# Patient Record
Sex: FEMALE | Race: BLACK OR AFRICAN AMERICAN | Employment: UNEMPLOYED | URBAN - METROPOLITAN AREA
[De-identification: names, ages, dates, MRNs, and addresses within clinical notes are randomized per-mention and may not be internally consistent; named-entity substitution may affect disease eponyms.]

---

## 2018-06-02 ENCOUNTER — HOSPITAL ENCOUNTER (EMERGENCY)
Age: 33
Discharge: HOME | End: 2018-06-02
Attending: EMERGENCY MEDICINE
Payer: SELF-PAY

## 2018-06-02 VITALS
OXYGEN SATURATION: 97 % | TEMPERATURE: 98.7 F | HEART RATE: 91 BPM | DIASTOLIC BLOOD PRESSURE: 95 MMHG | WEIGHT: 293 LBS | SYSTOLIC BLOOD PRESSURE: 157 MMHG | RESPIRATION RATE: 18 BRPM | BODY MASS INDEX: 50.02 KG/M2 | HEIGHT: 64 IN

## 2018-06-02 DIAGNOSIS — T19.2XXA FOREIGN BODY IN VAGINA, INITIAL ENCOUNTER: Primary | ICD-10-CM

## 2018-06-02 PROCEDURE — 99282 EMERGENCY DEPT VISIT SF MDM: CPT

## 2018-06-02 RX ORDER — IBUPROFEN 600 MG/1
TABLET ORAL
Status: ON HOLD | COMMUNITY
End: 2020-04-05

## 2018-06-02 NOTE — DISCHARGE INSTRUCTIONS
Object in the Vagina: Care Instructions  Your Care Instructions    If something is left in the vagina for too long, it can cause pain and irritation. This could be a tampon, a diaphragm, a pessary, or a vibrator. Sometimes, a condom comes off during sex and gets lost in the vagina. You may have bleeding, a bad smell, and a discharge from the vagina. After the object is taken out, symptoms usually go away. Follow-up care is a key part of your treatment and safety. Be sure to make and go to all appointments, and call your doctor if you are having problems. It's also a good idea to know your test results and keep a list of the medicines you take. How can you care for yourself at home? · If your doctor prescribed antibiotics, take them as directed. Do not stop taking them just because you feel better. You need to take the full course of antibiotics. · Do not use a douche or vaginal wash unless your doctor tells you to. · You can prevent future problems if you limit how long something is in your vagina. For example, change a tampon at least every 4 to 8 hours. When should you call for help? Watch closely for changes in your health, and be sure to contact your doctor if:  ? · Your symptoms do not improve, or they get worse. ? · You have a fever. Where can you learn more? Go to http://randa-livier.info/. Enter F149 in the search box to learn more about \"Object in the Vagina: Care Instructions. \"  Current as of: March 20, 2017  Content Version: 11.4  © 2873-9770 [employer]. Care instructions adapted under license by [employer] (which disclaims liability or warranty for this information). If you have questions about a medical condition or this instruction, always ask your healthcare professional. [address] any warranty or liability for your use of this information.

## 2018-06-02 NOTE — ED NOTES
Chaperoned pelvic exam with [facility]. Assisted in removal of tampon from vagina. Patient tolerated well.

## 2018-06-02 NOTE — ED PROVIDER NOTES
HPI Comments: 29 y/o female with no significant PMHx, presenting with complaint of vaginal foreign body. The patient states that she thinks she inserted a tampon 5 days ago but later she did not feel the stings, so she continued to use additional tampons throughout the week. Today she noticed some brown vaginal discharge however, which made her concerned about a retained tampon. She reports a previous episode of retained tampon that had to be removed. She denies any pain. No fevers, chills, nausea, vomiting, generalized body aches or dysuria. The history is provided by the patient. Past Medical History:   Diagnosis Date    HPV (human papilloma virus) infection     Trichomonas        Past Surgical History:   Procedure Laterality Date    HX OTHER SURGICAL      vaginal wart removal by laser    HX WISDOM TEETH EXTRACTION           History reviewed. No pertinent family history. Social History     Social History    Marital status: SINGLE     Spouse name: N/A    Number of children: N/A    Years of education: N/A     Occupational History    Not on file. Social History Main Topics    Smoking status: Current Some Day Smoker    Smokeless tobacco: Never Used    Alcohol use Yes    Drug use: Not on file    Sexual activity: Yes     Partners: Male     Birth control/ protection: Condom     Other Topics Concern    Not on file     Social History Narrative         ALLERGIES: Review of patient's allergies indicates no known allergies. Review of Systems   Constitutional: Negative for chills and fever. Respiratory: Negative for shortness of breath. Cardiovascular: Negative for chest pain. Gastrointestinal: Negative for diarrhea, nausea and vomiting. Genitourinary: Negative for dysuria, frequency and urgency. Musculoskeletal: Negative for arthralgias and myalgias. Skin: Negative for rash. Neurological: Negative for syncope and light-headedness.    All other systems reviewed and are negative. Vitals:    06/02/18 1517   BP: (!) 157/95   Pulse: 91   Resp: 18   Temp: 98.7 °F (37.1 °C)   SpO2: 97%   Weight: (!) 181.4 kg (400 lb)   Height: 5' 4\" (1.626 m)            Physical Exam   Constitutional: She is oriented to person, place, and time. She appears well-developed and well-nourished. No distress. HENT:   Head: Normocephalic and atraumatic. Eyes: Conjunctivae and EOM are normal.   Neck: Normal range of motion. Neck supple. Cardiovascular: Normal rate, regular rhythm and normal heart sounds. Pulmonary/Chest: Effort normal and breath sounds normal.   Abdominal: Soft. She exhibits no distension. There is no tenderness. Genitourinary: Pelvic exam was performed with patient supine. There is no rash, tenderness or lesion on the right labia. There is no rash, tenderness or lesion on the left labia. No erythema, tenderness or bleeding in the vagina. There is a foreign body in the vagina. No signs of injury around the vagina. No vaginal discharge found. Musculoskeletal: Normal range of motion. Neurological: She is alert and oriented to person, place, and time. Skin: Skin is warm and dry. She is not diaphoretic. Nursing note and vitals reviewed. MDM  Number of Diagnoses or Management Options  Foreign body in vagina, initial encounter:      Amount and/or Complexity of Data Reviewed  Discuss the patient with other providers: yes (Dr. Valentino Calico, ED attending)    Patient Progress  Patient progress: stable        ED Course       Foreign Body Removal  Date/Time: 6/2/2018 3:39 PM  Performed by: Daksha Underwood by: Marcin Burks     Consent:     Consent obtained:  Verbal    Consent given by:  Patient  Location:     Location: vagina. Anesthesia (see MAR for exact dosages): Anesthesia method:  None  Procedure type:     Procedure complexity:  Simple  Procedure details:     Localization method:  Visualized    Removal mechanism:   Forceps    Foreign bodies recovered:  1 Description:  Tampon    Intact foreign body removal: yes    Post-procedure details:     Confirmation:  No additional foreign bodies on visualization    Patient tolerance of procedure: Tolerated well, no immediate complications          29 y/o female with no significant PMHx, presenting with complaint of vaginal foreign body. Tampon visualized on exam, removed with forceps - see procedure note above for details. Patient is well-appearing, afebrile, no tachycardic - doubt toxic shock syndrome. Safe for discharge home, PCP and/or GYN follow up as needed. Strict ED return precautions discussed and provided in writing at time of discharge. The patient verbalized understanding and agreement with this plan.

## 2019-07-02 ENCOUNTER — OFFICE VISIT (OUTPATIENT)
Dept: PRIMARY CARE CLINIC | Age: 34
End: 2019-07-02

## 2019-07-02 VITALS
RESPIRATION RATE: 18 BRPM | HEIGHT: 64 IN | OXYGEN SATURATION: 99 % | HEART RATE: 85 BPM | BODY MASS INDEX: 50.02 KG/M2 | DIASTOLIC BLOOD PRESSURE: 80 MMHG | WEIGHT: 293 LBS | SYSTOLIC BLOOD PRESSURE: 118 MMHG | TEMPERATURE: 98.5 F

## 2019-07-02 DIAGNOSIS — E66.01 MORBID OBESITY WITH BMI OF 70 AND OVER, ADULT (HCC): Primary | ICD-10-CM

## 2019-07-02 DIAGNOSIS — M79.89 LEG SWELLING: ICD-10-CM

## 2019-07-02 DIAGNOSIS — R06.83 SNORING: ICD-10-CM

## 2019-07-02 DIAGNOSIS — R73.03 PREDIABETES: ICD-10-CM

## 2019-07-02 NOTE — PROGRESS NOTES
Eber Romero is a 35 y.o.  female and presents with     Chief Complaint   Patient presents with    Establish Care     Need to discuss weight loss options. She has tried different things over the years and nothing has really helped    Morbid Obesity     Patient is here to establish care. She is morbidly obese with a BMI over 70. She has tried herbal medicines over-the-counter to lose weight without any success. He does snore at times. She was pregnant one time but lost the fetus. She she is 28years of age and she wants to have a child before she gets too old and is high risk for pregnancy. She wants to lose weight so that she can get pregnant and have a child. Tried Keto diet, Herbalife, exercise in the past.      Past Medical History:   Diagnosis Date    HPV (human papilloma virus) infection     Trichomonas      Past Surgical History:   Procedure Laterality Date    HX OTHER SURGICAL      vaginal wart removal by laser    HX POLYPECTOMY  01/2018    HX WISDOM TEETH EXTRACTION       Current Outpatient Medications   Medication Sig    ibuprofen (MOTRIN) 600 mg tablet Take  by mouth every six (6) hours as needed for Pain. No current facility-administered medications for this visit. Health Maintenance   Topic Date Due    Pneumococcal 0-64 years (1 of 1 - PPSV23) 08/07/1991    DTaP/Tdap/Td series (1 - Tdap) 08/07/2006    PAP AKA CERVICAL CYTOLOGY  08/07/2006    Influenza Age 9 to Adult  08/01/2019       There is no immunization history on file for this patient. Patient's last menstrual period was 06/02/2019. Allergies and Intolerances:   No Known Allergies    Family History:   Family History   Problem Relation Age of Onset    Hypertension Mother     Hypertension Father     Diabetes Sister     Diabetes Paternal Grandmother        Social History:   She  reports that she has been smoking.   She has never used smokeless tobacco.  She  reports that she drinks alcohol. Review of Systems:   General: negative for - chills, fatigue, fever, weight change  Psych: negative for - anxiety, depression, irritability or mood swings  ENT: negative for - headaches, hearing change, nasal congestion, oral lesions, sneezing or sore throat  Heme/ Lymph: negative for - bleeding problems, bruising, pallor or swollen lymph nodes  Endo: negative for - hot flashes, polydipsia/polyuria or temperature intolerance  Resp: negative for - cough, shortness of breath or wheezing  CV: negative for - chest pain, edema or palpitations  GI: negative for - abdominal pain, change in bowel habits, constipation, diarrhea or nausea/vomiting  : negative for - dysuria, hematuria, incontinence, pelvic pain or vulvar/vaginal symptoms  MSK: negative for - joint pain, joint swelling or muscle pain  Neuro: negative for - confusion, headaches, seizures or weakness  Derm: negative for - dry skin, hair changes, rash or skin lesion changes          Physical:   Vitals:   Vitals:    07/02/19 1354   BP: 118/80   Pulse: 85   Resp: 18   Temp: 98.5 °F (36.9 °C)   TempSrc: Oral   SpO2: 99%   Weight: (!) 408 lb (185.1 kg)   Height: 5' 4\" (1.626 m)           Exam:   HEENT- atraumatic,normocephalic, awake, oriented, well nourished, morbid obesity  Neck - supple,no enlarged lymph nodes, no JVD, no thyromegaly  Chest- CTA, no rhonchi, no crackles  Heart- rrr, no murmurs / gallop/rub  Abdomen- soft,BS+,NT, no hepatosplenomegaly  Ext -trace leg swelling  Neuro- no focal deficits. Power 5/5 all extremities  Skin - warm,dry, no obvious rashes. Review of Data:   LABS:   No results found for: WBC, HGB, HCT, PLT, HGBEXT, HCTEXT, PLTEXT, HGBEXT, HCTEXT, PLTEXT  No results found for: NA, K, CL, CO2, GLU, BUN, CREA  No results found for: CHOL, CHOLX, CHLST, CHOLV, HDL, LDL, LDLC, DLDLP, TGLX, TRIGL, TRIGP  No results found for: GPT        Impression / Plan:        ICD-10-CM ICD-9-CM    1.  Morbid obesity with BMI of 70 and over, adult ([facility] E66.01 278.01 CBC WITH AUTOMATED DIFF    C91.65 G06.47 METABOLIC PANEL, COMPREHENSIVE      TSH 3RD GENERATION      LIPID PANEL      REFERRAL TO DIETITIAN      REFERRAL TO BARIATRIC SURGERY   2. Prediabetes R73.03 790.29 HEMOGLOBIN A1C WITH EAG   3. Snoring R06.83 786.09    4. Leg swelling M79.89 729.81          Explained to patient risk benefits of the medications. Advised patient to stop meds if having any side effects. Pt verbalized understanding of the instructions. I have discussed the diagnosis with the patient and the intended plan as seen in the above orders. The patient has received an after-visit summary and questions were answered concerning future plans. I have discussed medication side effects and warnings with the patient as well. I have reviewed the plan of care with the patient, accepted their input and they are in agreement with the treatment goals. Reviewed plan of care. Patient has provided input and agrees with goals.         Fer Dudley MD

## 2019-07-02 NOTE — PROGRESS NOTES
Shital Yoon is a 35 y.o. female    Chief Complaint   Patient presents with    Establish Care     Need to discuss weight loss options. She has tried different things over the years and nothing has really helped     1. Have you been to the ER, urgent care clinic since your last visit? Hospitalized since your last visit? No    2. Have you seen or consulted any other health care providers outside of the [address] [place] [address] since your last visit? Include any pap smears or colon screening.  No     Health Maintenance Due   Topic Date Due    Pneumococcal 0-64 years (1 of 1 - PPSV23) 08/07/1991    DTaP/Tdap/Td series (1 - Tdap) 08/07/2006    PAP AKA CERVICAL CYTOLOGY  08/07/2006

## 2019-07-03 ENCOUNTER — TELEPHONE (OUTPATIENT)
Dept: PRIMARY CARE CLINIC | Age: 34
End: 2019-07-03

## 2019-07-03 LAB
ALBUMIN SERPL-MCNC: 3.7 G/DL (ref 3.5–5.5)
ALBUMIN/GLOB SERPL: 1.2 {RATIO} (ref 1.2–2.2)
ALP SERPL-CCNC: 74 IU/L (ref 39–117)
ALT SERPL-CCNC: 16 IU/L (ref 0–32)
AST SERPL-CCNC: 14 IU/L (ref 0–40)
BASOPHILS # BLD AUTO: 0 X10E3/UL (ref 0–0.2)
BASOPHILS NFR BLD AUTO: 0 %
BILIRUB SERPL-MCNC: 0.2 MG/DL (ref 0–1.2)
BUN SERPL-MCNC: 9 MG/DL (ref 6–20)
BUN/CREAT SERPL: 13 (ref 9–23)
CALCIUM SERPL-MCNC: 8.7 MG/DL (ref 8.7–10.2)
CHLORIDE SERPL-SCNC: 106 MMOL/L (ref 96–106)
CHOLEST SERPL-MCNC: 137 MG/DL (ref 100–199)
CO2 SERPL-SCNC: 23 MMOL/L (ref 20–29)
CREAT SERPL-MCNC: 0.71 MG/DL (ref 0.57–1)
EOSINOPHIL # BLD AUTO: 0.3 X10E3/UL (ref 0–0.4)
EOSINOPHIL NFR BLD AUTO: 3 %
ERYTHROCYTE [DISTWIDTH] IN BLOOD BY AUTOMATED COUNT: 15.1 % (ref 12.3–15.4)
EST. AVERAGE GLUCOSE BLD GHB EST-MCNC: 123 MG/DL
GLOBULIN SER CALC-MCNC: 3 G/DL (ref 1.5–4.5)
GLUCOSE SERPL-MCNC: 82 MG/DL (ref 65–99)
HBA1C MFR BLD: 5.9 % (ref 4.8–5.6)
HCT VFR BLD AUTO: 38.4 % (ref 34–46.6)
HDLC SERPL-MCNC: 39 MG/DL
HGB BLD-MCNC: 11.8 G/DL (ref 11.1–15.9)
IMM GRANULOCYTES # BLD AUTO: 0 X10E3/UL (ref 0–0.1)
IMM GRANULOCYTES NFR BLD AUTO: 0 %
LDLC SERPL CALC-MCNC: 86 MG/DL (ref 0–99)
LYMPHOCYTES # BLD AUTO: 2.9 X10E3/UL (ref 0.7–3.1)
LYMPHOCYTES NFR BLD AUTO: 28 %
MCH RBC QN AUTO: 24.6 PG (ref 26.6–33)
MCHC RBC AUTO-ENTMCNC: 30.7 G/DL (ref 31.5–35.7)
MCV RBC AUTO: 80 FL (ref 79–97)
MONOCYTES # BLD AUTO: 0.5 X10E3/UL (ref 0.1–0.9)
MONOCYTES NFR BLD AUTO: 5 %
NEUTROPHILS # BLD AUTO: 6.5 X10E3/UL (ref 1.4–7)
NEUTROPHILS NFR BLD AUTO: 64 %
PLATELET # BLD AUTO: 357 X10E3/UL (ref 150–450)
POTASSIUM SERPL-SCNC: 4.1 MMOL/L (ref 3.5–5.2)
PROT SERPL-MCNC: 6.7 G/DL (ref 6–8.5)
RBC # BLD AUTO: 4.8 X10E6/UL (ref 3.77–5.28)
SODIUM SERPL-SCNC: 140 MMOL/L (ref 134–144)
TRIGL SERPL-MCNC: 58 MG/DL (ref 0–149)
TSH SERPL DL<=0.005 MIU/L-ACNC: 1.47 UIU/ML (ref 0.45–4.5)
VLDLC SERPL CALC-MCNC: 12 MG/DL (ref 5–40)
WBC # BLD AUTO: 10.2 X10E3/UL (ref 3.4–10.8)

## 2019-07-03 NOTE — TELEPHONE ENCOUNTER
The diabetes treatment center needs clarification if thye want the patient to have regular medical nutritian therapy or do you want weight management as well.     Please contact rochelle at [phone]

## 2019-08-19 ENCOUNTER — OFFICE VISIT (OUTPATIENT)
Dept: PRIMARY CARE CLINIC | Age: 34
End: 2019-08-19

## 2019-08-19 VITALS
HEIGHT: 64 IN | SYSTOLIC BLOOD PRESSURE: 136 MMHG | WEIGHT: 293 LBS | OXYGEN SATURATION: 99 % | HEART RATE: 77 BPM | DIASTOLIC BLOOD PRESSURE: 85 MMHG | RESPIRATION RATE: 17 BRPM | TEMPERATURE: 98.3 F | BODY MASS INDEX: 50.02 KG/M2

## 2019-08-19 DIAGNOSIS — E66.01 OBESITY, MORBID (HCC): ICD-10-CM

## 2019-08-19 RX ORDER — PHENTERMINE HYDROCHLORIDE 37.5 MG/1
37.5 TABLET ORAL
Qty: 30 TAB | Refills: 1 | Status: ON HOLD | OUTPATIENT
Start: 2019-08-19 | End: 2020-04-05

## 2019-08-19 NOTE — PROGRESS NOTES
Stacie Weber is a 29 y.o. female    Chief Complaint   Patient presents with    Morbid Obesity    Weight Management     would like to talk about weight loss meds       1. Have you been to the ER, urgent care clinic since your last visit? Hospitalized since your last visit? No    2. Have you seen or consulted any other health care providers outside of the [address] [place] [address] since your last visit? Include any pap smears or colon screening. No    No flowsheet data found.      Health Maintenance Due   Topic Date Due    Pneumococcal 0-64 years (1 of 1 - PPSV23) 08/07/1991    DTaP/Tdap/Td series (1 - Tdap) 08/07/2006    PAP AKA CERVICAL CYTOLOGY  08/07/2006    Influenza Age 9 to Adult  08/01/2019

## 2019-08-19 NOTE — PROGRESS NOTES
Joshua Juárez is a 29 y.o.  female and presents with     Chief Complaint   Patient presents with    Morbid Obesity    Weight Management     would like to talk about weight loss meds     Patient is here for follow-up on her lab results. She has made appointment with bariatric surgery. He is supposed to go to obesity class. She informed that she has tried phentermine in the past and has not had any side effects. She is willing to try it again. Past Medical History:   Diagnosis Date    HPV (human papilloma virus) infection     Trichomonas      Past Surgical History:   Procedure Laterality Date    HX OTHER SURGICAL      vaginal wart removal by laser    HX POLYPECTOMY  01/2018    HX WISDOM TEETH EXTRACTION       Current Outpatient Medications   Medication Sig    phentermine (ADIPEX-P) 37.5 mg tablet Take 1 Tab by mouth every morning. Max Daily Amount: 37.5 mg.    ibuprofen (MOTRIN) 600 mg tablet Take  by mouth every six (6) hours as needed for Pain. No current facility-administered medications for this visit. Health Maintenance   Topic Date Due    Pneumococcal 0-64 years (1 of 1 - PPSV23) 08/07/1991    DTaP/Tdap/Td series (1 - Tdap) 08/07/2006    PAP AKA CERVICAL CYTOLOGY  08/07/2006    Influenza Age 9 to Adult  08/01/2019       There is no immunization history on file for this patient. Patient's last menstrual period was 07/10/2019 (exact date). Allergies and Intolerances:   No Known Allergies    Family History:   Family History   Problem Relation Age of Onset    Hypertension Mother     Hypertension Father     Diabetes Sister     Diabetes Paternal Grandmother        Social History:   She  reports that she has been smoking. She has been smoking about 0.25 packs per day. She has never used smokeless tobacco.  She  reports that she drinks alcohol.             Review of Systems:   General: negative for - chills, fatigue, fever, weight change  Psych: negative for - anxiety, depression, irritability or mood swings  ENT: negative for - headaches, hearing change, nasal congestion, oral lesions, sneezing or sore throat  Heme/ Lymph: negative for - bleeding problems, bruising, pallor or swollen lymph nodes  Endo: negative for - hot flashes, polydipsia/polyuria or temperature intolerance  Resp: negative for - cough, shortness of breath or wheezing  CV: negative for - chest pain, edema or palpitations  GI: negative for - abdominal pain, change in bowel habits, constipation, diarrhea or nausea/vomiting  : negative for - dysuria, hematuria, incontinence, pelvic pain or vulvar/vaginal symptoms  MSK: negative for - joint pain, joint swelling or muscle pain  Neuro: negative for - confusion, headaches, seizures or weakness  Derm: negative for - dry skin, hair changes, rash or skin lesion changes          Physical:   Vitals:   Vitals:    08/19/19 1309   BP: 136/85   Pulse: 77   Resp: 17   Temp: 98.3 °F (36.8 °C)   TempSrc: Oral   SpO2: 99%   Weight: (!) 408 lb 12.8 oz (185.4 kg)   Height: 5' 4\" (1.626 m)           Exam:   HEENT- atraumatic,normocephalic, awake, oriented, well nourished, morbid obesity  Neck - supple,no enlarged lymph nodes, no JVD, no thyromegaly  Chest- CTA, no rhonchi, no crackles  Heart- rrr, no murmurs / gallop/rub  Abdomen- soft,BS+,NT, no hepatosplenomegaly  Ext - no c/c/edema   Neuro- no focal deficits. Power 5/5 all extremities  Skin - warm,dry, no obvious rashes.           Review of Data:   LABS:   Lab Results   Component Value Date/Time    WBC 10.2 07/02/2019 02:37 PM    HGB 11.8 07/02/2019 02:37 PM    HCT 38.4 07/02/2019 02:37 PM    PLATELET 961 06/43/5555 02:37 PM     Lab Results   Component Value Date/Time    Sodium 140 07/02/2019 02:37 PM    Potassium 4.1 07/02/2019 02:37 PM    Chloride 106 07/02/2019 02:37 PM    CO2 23 07/02/2019 02:37 PM    Glucose 82 07/02/2019 02:37 PM    BUN 9 07/02/2019 02:37 PM    Creatinine 0.71 07/02/2019 02:37 PM     Lab Results   Component Value Date/Time    Cholesterol, total 137 07/02/2019 02:37 PM    HDL Cholesterol 39 (L) 07/02/2019 02:37 PM    LDL, calculated 86 07/02/2019 02:37 PM    Triglyceride 58 07/02/2019 02:37 PM     No results found for: GPT        Impression / Plan:        ICD-10-CM ICD-9-CM    1. Obesity, morbid (HCC) E66.01 278.01 phentermine (ADIPEX-P) 37.5 mg tablet     Explained the patient that phentermine is going to be short-term. She needs to walk every day and exercise to help her lose weight. Asked patient to keep appointment with bariatric surgery. Explained to patient risk benefits of the medications. Advised patient to stop meds if having any side effects. Pt verbalized understanding of the instructions. I have discussed the diagnosis with the patient and the intended plan as seen in the above orders. The patient has received an after-visit summary and questions were answered concerning future plans. I have discussed medication side effects and warnings with the patient as well. I have reviewed the plan of care with the patient, accepted their input and they are in agreement with the treatment goals. Reviewed plan of care. Patient has provided input and agrees with goals. Follow-up and Dispositions    · Return in about 3 months (around 11/19/2019).          Donnie Painting MD

## 2020-04-05 ENCOUNTER — APPOINTMENT (OUTPATIENT)
Dept: CT IMAGING | Age: 35
DRG: 139 | End: 2020-04-05
Attending: EMERGENCY MEDICINE
Payer: MEDICAID

## 2020-04-05 ENCOUNTER — HOSPITAL ENCOUNTER (INPATIENT)
Age: 35
LOS: 2 days | Discharge: HOME | DRG: 139 | End: 2020-04-07
Attending: EMERGENCY MEDICINE | Admitting: FAMILY MEDICINE
Payer: MEDICAID

## 2020-04-05 ENCOUNTER — HOSPITAL ENCOUNTER (EMERGENCY)
Dept: GENERAL RADIOLOGY | Age: 35
Discharge: HOME | DRG: 139 | End: 2020-04-05
Attending: EMERGENCY MEDICINE
Payer: MEDICAID

## 2020-04-05 DIAGNOSIS — J18.9 COMMUNITY ACQUIRED PNEUMONIA OF RIGHT LUNG, UNSPECIFIED PART OF LUNG: Primary | ICD-10-CM

## 2020-04-05 DIAGNOSIS — J45.41 MODERATE PERSISTENT ASTHMA WITH ACUTE EXACERBATION: ICD-10-CM

## 2020-04-05 PROBLEM — I10 HYPERTENSION: Status: ACTIVE | Noted: 2020-04-05

## 2020-04-05 LAB
ALBUMIN SERPL-MCNC: 3.8 G/DL (ref 3.5–5)
ALBUMIN/GLOB SERPL: 0.8 {RATIO} (ref 1.1–2.2)
ALP SERPL-CCNC: 85 U/L (ref 45–117)
ALT SERPL-CCNC: 30 U/L (ref 12–78)
ANION GAP SERPL CALC-SCNC: 10 MMOL/L (ref 5–15)
AST SERPL-CCNC: 24 U/L (ref 15–37)
BASOPHILS # BLD: 0 K/UL (ref 0–0.1)
BASOPHILS NFR BLD: 0 % (ref 0–1)
BILIRUB SERPL-MCNC: 0.4 MG/DL (ref 0.2–1)
BNP SERPL-MCNC: 43 PG/ML (ref 0–125)
BUN SERPL-MCNC: 4 MG/DL (ref 6–20)
BUN/CREAT SERPL: 6 (ref 12–20)
CALCIUM SERPL-MCNC: 8.8 MG/DL (ref 8.5–10.1)
CHLORIDE SERPL-SCNC: 105 MMOL/L (ref 97–108)
CO2 SERPL-SCNC: 29 MMOL/L (ref 21–32)
COMMENT, HOLDF: NORMAL
CREAT SERPL-MCNC: 0.69 MG/DL (ref 0.55–1.02)
DIFFERENTIAL METHOD BLD: ABNORMAL
EOSINOPHIL # BLD: 0.8 K/UL (ref 0–0.4)
EOSINOPHIL NFR BLD: 7 % (ref 0–7)
ERYTHROCYTE [DISTWIDTH] IN BLOOD BY AUTOMATED COUNT: 15.6 % (ref 11.5–14.5)
GLOBULIN SER CALC-MCNC: 4.6 G/DL (ref 2–4)
GLUCOSE SERPL-MCNC: 97 MG/DL (ref 65–100)
HCT VFR BLD AUTO: 39.1 % (ref 35–47)
HGB BLD-MCNC: 11.8 G/DL (ref 11.5–16)
IMM GRANULOCYTES # BLD AUTO: 0 K/UL (ref 0–0.04)
IMM GRANULOCYTES NFR BLD AUTO: 0 % (ref 0–0.5)
LYMPHOCYTES # BLD: 2.1 K/UL (ref 0.8–3.5)
LYMPHOCYTES NFR BLD: 18 % (ref 12–49)
MCH RBC QN AUTO: 24.2 PG (ref 26–34)
MCHC RBC AUTO-ENTMCNC: 30.2 G/DL (ref 30–36.5)
MCV RBC AUTO: 80.1 FL (ref 80–99)
MONOCYTES # BLD: 0.6 K/UL (ref 0–1)
MONOCYTES NFR BLD: 5 % (ref 5–13)
NEUTS SEG # BLD: 8.1 K/UL (ref 1.8–8)
NEUTS SEG NFR BLD: 69 % (ref 32–75)
NRBC # BLD: 0 K/UL (ref 0–0.01)
NRBC BLD-RTO: 0 PER 100 WBC
PLATELET # BLD AUTO: 357 K/UL (ref 150–400)
PMV BLD AUTO: 9.2 FL (ref 8.9–12.9)
POTASSIUM SERPL-SCNC: 4.3 MMOL/L (ref 3.5–5.1)
PROT SERPL-MCNC: 8.4 G/DL (ref 6.4–8.2)
RBC # BLD AUTO: 4.88 M/UL (ref 3.8–5.2)
SAMPLES BEING HELD,HOLD: NORMAL
SODIUM SERPL-SCNC: 144 MMOL/L (ref 136–145)
WBC # BLD AUTO: 11.7 K/UL (ref 3.6–11)

## 2020-04-05 PROCEDURE — 94640 AIRWAY INHALATION TREATMENT: CPT

## 2020-04-05 PROCEDURE — 74011250637 HC RX REV CODE- 250/637: Performed by: INTERNAL MEDICINE

## 2020-04-05 PROCEDURE — 74011636320 HC RX REV CODE- 636/320: Performed by: EMERGENCY MEDICINE

## 2020-04-05 PROCEDURE — 80053 COMPREHEN METABOLIC PANEL: CPT

## 2020-04-05 PROCEDURE — 65660000000 HC RM CCU STEPDOWN

## 2020-04-05 PROCEDURE — 87635 SARS-COV-2 COVID-19 AMP PRB: CPT

## 2020-04-05 PROCEDURE — 74011000250 HC RX REV CODE- 250: Performed by: EMERGENCY MEDICINE

## 2020-04-05 PROCEDURE — 74011250636 HC RX REV CODE- 250/636: Performed by: INTERNAL MEDICINE

## 2020-04-05 PROCEDURE — 0100U RESPIRATORY PANEL,PCR,NASOPHARYNGEAL: CPT

## 2020-04-05 PROCEDURE — 74011250636 HC RX REV CODE- 250/636: Performed by: EMERGENCY MEDICINE

## 2020-04-05 PROCEDURE — 96365 THER/PROPH/DIAG IV INF INIT: CPT

## 2020-04-05 PROCEDURE — 99285 EMERGENCY DEPT VISIT HI MDM: CPT

## 2020-04-05 PROCEDURE — 71045 X-RAY EXAM CHEST 1 VIEW: CPT

## 2020-04-05 PROCEDURE — 83880 ASSAY OF NATRIURETIC PEPTIDE: CPT

## 2020-04-05 PROCEDURE — 71275 CT ANGIOGRAPHY CHEST: CPT

## 2020-04-05 PROCEDURE — 85025 COMPLETE CBC W/AUTO DIFF WBC: CPT

## 2020-04-05 PROCEDURE — 71260 CT THORAX DX C+: CPT

## 2020-04-05 PROCEDURE — 87040 BLOOD CULTURE FOR BACTERIA: CPT

## 2020-04-05 PROCEDURE — 71046 X-RAY EXAM CHEST 2 VIEWS: CPT

## 2020-04-05 PROCEDURE — 36415 COLL VENOUS BLD VENIPUNCTURE: CPT

## 2020-04-05 PROCEDURE — 96375 TX/PRO/DX INJ NEW DRUG ADDON: CPT

## 2020-04-05 RX ORDER — ACETAMINOPHEN 325 MG/1
650 TABLET ORAL
COMMUNITY

## 2020-04-05 RX ORDER — GUAIFENESIN 600 MG/1
600 TABLET, EXTENDED RELEASE ORAL EVERY 12 HOURS
Status: DISCONTINUED | OUTPATIENT
Start: 2020-04-05 | End: 2020-04-07 | Stop reason: HOSPADM

## 2020-04-05 RX ORDER — MAGNESIUM SULFATE HEPTAHYDRATE 40 MG/ML
2 INJECTION, SOLUTION INTRAVENOUS ONCE
Status: COMPLETED | OUTPATIENT
Start: 2020-04-05 | End: 2020-04-05

## 2020-04-05 RX ORDER — ALBUTEROL SULFATE 0.83 MG/ML
2.5 SOLUTION RESPIRATORY (INHALATION)
COMMUNITY
End: 2020-10-29

## 2020-04-05 RX ORDER — GUAIFENESIN/DEXTROMETHORPHAN 100-10MG/5
10 SYRUP ORAL
Status: DISCONTINUED | OUTPATIENT
Start: 2020-04-05 | End: 2020-04-07 | Stop reason: HOSPADM

## 2020-04-05 RX ORDER — ALBUTEROL SULFATE 90 UG/1
2 AEROSOL, METERED RESPIRATORY (INHALATION)
COMMUNITY

## 2020-04-05 RX ORDER — MINERAL OIL
180 ENEMA (ML) RECTAL DAILY
COMMUNITY

## 2020-04-05 RX ORDER — SODIUM CHLORIDE 0.9 % (FLUSH) 0.9 %
5-40 SYRINGE (ML) INJECTION AS NEEDED
Status: DISCONTINUED | OUTPATIENT
Start: 2020-04-05 | End: 2020-04-07 | Stop reason: HOSPADM

## 2020-04-05 RX ORDER — AMLODIPINE BESYLATE 5 MG/1
5 TABLET ORAL DAILY
Status: DISCONTINUED | OUTPATIENT
Start: 2020-04-05 | End: 2020-04-07 | Stop reason: HOSPADM

## 2020-04-05 RX ORDER — HYDRALAZINE HYDROCHLORIDE 20 MG/ML
20 INJECTION INTRAMUSCULAR; INTRAVENOUS
Status: DISCONTINUED | OUTPATIENT
Start: 2020-04-05 | End: 2020-04-07 | Stop reason: HOSPADM

## 2020-04-05 RX ORDER — SODIUM CHLORIDE 0.9 % (FLUSH) 0.9 %
5-40 SYRINGE (ML) INJECTION EVERY 8 HOURS
Status: DISCONTINUED | OUTPATIENT
Start: 2020-04-05 | End: 2020-04-07 | Stop reason: HOSPADM

## 2020-04-05 RX ORDER — SODIUM CHLORIDE 9 MG/ML
50 INJECTION, SOLUTION INTRAVENOUS CONTINUOUS
Status: DISCONTINUED | OUTPATIENT
Start: 2020-04-05 | End: 2020-04-06

## 2020-04-05 RX ADMIN — AZITHROMYCIN MONOHYDRATE 500 MG: 500 INJECTION, POWDER, LYOPHILIZED, FOR SOLUTION INTRAVENOUS at 21:12

## 2020-04-05 RX ADMIN — GUAIFENESIN 600 MG: 600 TABLET ORAL at 21:13

## 2020-04-05 RX ADMIN — AMLODIPINE BESYLATE 5 MG: 5 TABLET ORAL at 21:13

## 2020-04-05 RX ADMIN — SODIUM CHLORIDE 100 ML/HR: 900 INJECTION, SOLUTION INTRAVENOUS at 17:45

## 2020-04-05 RX ADMIN — IOPAMIDOL 100 ML: 755 INJECTION, SOLUTION INTRAVENOUS at 16:52

## 2020-04-05 RX ADMIN — ALBUTEROL SULFATE 1 DOSE: 2.5 SOLUTION RESPIRATORY (INHALATION) at 15:32

## 2020-04-05 RX ADMIN — HYDRALAZINE HYDROCHLORIDE 20 MG: 20 INJECTION INTRAMUSCULAR; INTRAVENOUS at 22:44

## 2020-04-05 RX ADMIN — ALBUTEROL SULFATE 1 DOSE: 2.5 SOLUTION RESPIRATORY (INHALATION) at 14:40

## 2020-04-05 RX ADMIN — WATER 2 G: 1 INJECTION INTRAMUSCULAR; INTRAVENOUS; SUBCUTANEOUS at 17:44

## 2020-04-05 RX ADMIN — Medication 10 ML: at 17:45

## 2020-04-05 RX ADMIN — Medication 10 ML: at 21:14

## 2020-04-05 RX ADMIN — MAGNESIUM SULFATE HEPTAHYDRATE 2 G: 40 INJECTION, SOLUTION INTRAVENOUS at 15:08

## 2020-04-05 RX ADMIN — GUAIFENESIN AND DEXTROMETHORPHAN 10 ML: 100; 10 SYRUP ORAL at 22:45

## 2020-04-05 RX ADMIN — METHYLPREDNISOLONE SODIUM SUCCINATE 125 MG: 125 INJECTION, POWDER, FOR SOLUTION INTRAMUSCULAR; INTRAVENOUS at 14:56

## 2020-04-05 NOTE — ED NOTES
TRANSFER - OUT REPORT:    Verbal report given to Marychuy RN(name) on Deepti Puente  being transferred to [place] [id number].) for routine progression of care       Report consisted of patients Situation, Background, Assessment and   Recommendations(SBAR). Information from the following report(s) SBAR, MAR, vitals, telemetry, all test results, isolation, o2 NC 2L was reviewed with the receiving nurse. Lines:   Peripheral IV 04/05/20 Right Antecubital (Active)   Site Assessment Clean, dry, & intact 4/5/2020  3:08 PM   Phlebitis Assessment 0 4/5/2020  3:08 PM   Infiltration Assessment 0 4/5/2020  3:08 PM   Dressing Status Clean, dry, & intact 4/5/2020  3:08 PM   Dressing Type Transparent 4/5/2020  3:08 PM   Hub Color/Line Status Pink 4/5/2020  3:08 PM       Peripheral IV 04/05/20 Left Antecubital (Active)   Site Assessment Clean, dry, & intact 4/5/2020  3:40 PM   Phlebitis Assessment 0 4/5/2020  3:40 PM   Infiltration Assessment 0 4/5/2020  3:40 PM   Dressing Status Clean, dry, & intact 4/5/2020  3:40 PM   Dressing Type Transparent 4/5/2020  3:40 PM   Hub Color/Line Status Pink 4/5/2020  3:40 PM        Opportunity for questions and clarification was provided. Patient transported with: [facility] ALS monitoring NS 100ml/hr, 2L NC reassessment at this time is unchanged pt is stable for transport.

## 2020-04-05 NOTE — ED PROVIDER NOTES
HPI   The patient is a 40-year-old black female who presents the emergency room with acute onset of a cough for about 4 to 5 days not associated with fever. In addition she has been wheezing and has a history of asthma in the past using an inhaler at this time. She is not currently on steroids.   She had no known exposure to covid   Past Medical History:   Diagnosis Date    HPV (human papilloma virus) infection     Ill-defined condition     \"Bronchitis\"    Trichomonas        Past Surgical History:   Procedure Laterality Date    HX OTHER SURGICAL      vaginal wart removal by laser    HX POLYPECTOMY  01/2018    HX WISDOM TEETH EXTRACTION           Family History:   Problem Relation Age of Onset    Hypertension Mother     Hypertension Father     Diabetes Sister     Diabetes Paternal Grandmother        Social History     Socioeconomic History    Marital status: SINGLE     Spouse name: Not on file    Number of children: Not on file    Years of education: Not on file    Highest education level: Not on file   Occupational History    Not on file   Social Needs    Financial resource strain: Not on file    Food insecurity     Worry: Not on file     Inability: Not on file    Transportation needs     Medical: Not on file     Non-medical: Not on file   Tobacco Use    Smoking status: Current Every Day Smoker     Packs/day: 0.25    Smokeless tobacco: Never Used   Substance and Sexual Activity    Alcohol use: Yes     Comment: socially    Drug use: Not Currently    Sexual activity: Yes     Partners: Male     Birth control/protection: Condom   Lifestyle    Physical activity     Days per week: Not on file     Minutes per session: Not on file    Stress: Not on file   Relationships    Social connections     Talks on phone: Not on file     Gets together: Not on file     Attends Zoroastrianism service: Not on file     Active member of club or organization: Not on file     Attends meetings of clubs or organizations: Not on file     Relationship status: Not on file    Intimate partner violence     Fear of current or ex partner: Not on file     Emotionally abused: Not on file     Physically abused: Not on file     Forced sexual activity: Not on file   Other Topics Concern    Not on file   Social History Narrative    Not on file         ALLERGIES: Patient has no known allergies. Review of Systems   All other systems reviewed and are negative. There were no vitals filed for this visit. Physical Exam  Vitals signs and nursing note reviewed. Constitutional:       Appearance: She is well-developed. HENT:      Head: Normocephalic and atraumatic. Mouth/Throat:      Pharynx: No oropharyngeal exudate. Eyes:      General: No scleral icterus. Conjunctiva/sclera: Conjunctivae normal.   Neck:      Musculoskeletal: Neck supple. Thyroid: No thyromegaly. Cardiovascular:      Rate and Rhythm: Normal rate and regular rhythm. Heart sounds: Normal heart sounds. No murmur. No friction rub. No gallop. Pulmonary:      Effort: No respiratory distress. Breath sounds: No stridor. No wheezing or rales. Comments: bilat wheezing 3 plus acc muscle use  Abdominal:      General: Bowel sounds are normal.      Palpations: Abdomen is soft. Tenderness: There is no abdominal tenderness. There is no guarding or rebound. Musculoskeletal: Normal range of motion. Lymphadenopathy:      Cervical: No cervical adenopathy. Skin:     General: Skin is warm and dry. Neurological:      Mental Status: She is alert and oriented to person, place, and time. MDM  Number of Diagnoses or Management Options  Community acquired pneumonia of right lung, unspecified part of lung:    Moderate persistent asthma with acute exacerbation:      Amount and/or Complexity of Data Reviewed  Clinical lab tests: ordered and reviewed  Tests in the radiology section of CPT®: ordered  Tests in the medicine section of CPT®: ordered and reviewed    Risk of Complications, Morbidity, and/or Mortality  Presenting problems: high  Diagnostic procedures: high  Management options: high           Procedures        Assessment and plan       the patient is having acute asthma attack somewhat better though CT shows infiltrate will admit for wheezing and community-acquired pneumonia rule out covid, she is requiring supplemental O2.        Total critical care time spent exclusive of procedures: 34   minutes

## 2020-04-05 NOTE — ED TRIAGE NOTES
Pt was wheeled to the treatment area. Pt states \"I have been short of breath for 3 days I have used an inhaler it has not worked. \" Pt has audible wheezing.

## 2020-04-05 NOTE — ED NOTES
Spoke with [facility] explained transport at [facility] and ready for transport now. Per Sacha 28 supervisor \"proceede with transport as scheduled. \"

## 2020-04-05 NOTE — ED NOTES
Post breathing treatments pt still with wheezing but improved air movement. Pt sitting up on stretcher texting at this time.

## 2020-04-05 NOTE — ED NOTES
Pt able to speak in full sentences spo2 95% RA pt requested to walk to bathroom. Pt ambulated to bathroom in room upon return pt had increased dyspnea with exertion.

## 2020-04-05 NOTE — ED NOTES
Pt placed on 2L NC for CT as she will need to lay flat. Per Dr Maurisio Grigsby ok to continue o2 after CT as well.

## 2020-04-05 NOTE — ED NOTES
TRANSFER - OUT REPORT:    Verbal report given to [facility] NRP with [facility](name) on Mode Minors  being transferred to [address] [place] [address] 335(unit) for routine progression of care       Report consisted of patients Situation, Background, Assessment and   Recommendations(SBAR). Information from the following report(s) SBAR, MAR, vitals, all test results, isolation, Telemetry was reviewed with the [facility] NRP. Lines:   Peripheral IV 04/05/20 Right Antecubital (Active)   Site Assessment Clean, dry, & intact 4/5/2020  3:08 PM   Phlebitis Assessment 0 4/5/2020  3:08 PM   Infiltration Assessment 0 4/5/2020  3:08 PM   Dressing Status Clean, dry, & intact 4/5/2020  3:08 PM   Dressing Type Transparent 4/5/2020  3:08 PM   Hub Color/Line Status Pink 4/5/2020  3:08 PM       Peripheral IV 04/05/20 Left Antecubital (Active)   Site Assessment Clean, dry, & intact 4/5/2020  3:40 PM   Phlebitis Assessment 0 4/5/2020  3:40 PM   Infiltration Assessment 0 4/5/2020  3:40 PM   Dressing Status Clean, dry, & intact 4/5/2020  3:40 PM   Dressing Type Transparent 4/5/2020  3:40 PM   Hub Color/Line Status Pink 4/5/2020  3:40 PM        Opportunity for questions and clarification was provided. Patient transported with: [facility] ALS monitoring, /hr, Reassessment at this time is unchanged pt is stable for transport.

## 2020-04-05 NOTE — ED NOTES
Clarified with Dr Hamida Weiss if Lactate will need to be drawn. Per Dr Hamida Weiss \"no lactate needed. \"

## 2020-04-05 NOTE — ED NOTES
Attempted to call report to [address] [place] [address] RN made aware transport is here, unable to give report at this time.

## 2020-04-05 NOTE — PROGRESS NOTES
1845  TRANSFER - IN REPORT:    Verbal report received from Nino Estrada RN(name) on Reche Minors  being received from [place], RN(unit) for routine progression of care      Report consisted of patients Situation, Background, Assessment and   Recommendations(SBAR). Information from the following report(s) SBAR, Kardex, Intake/Output, MAR, Recent Results and Cardiac Rhythm ST was reviewed with the receiving nurse. Opportunity for questions and clarification was provided. 1945 Patient arrived to the unit via ambulance. Introduced self as primary RN. Initial assessment completed. VSS. Pt denies additional complaints at this time. Plan for the evening discussed with pt and she verbalized understanding. Bed locked and in low position with call bell within reach. Instructed pt to press call joya when needed. White board updated with this RN's name. MD at bedside. Notified of elevated blood pressures. Blood pressure 197/106.    2240 Blood pressure 171/109. PRN hydralazine 20mg was administered. 0048 /116. Cuff was adjusted and rechecked for proper placement. MD was notified. Scott LARA was notified of continued elevated blood pressure. 0129 Clarification was obtained regarding orders for NS at 50ml/hr and 100ml/hr. Dr. Melisa Maloney ordered both to be discontinued. 0345 Pt resting quietly in bed with eyes closed, respirations even and unlabored. No acute distress noted. Call bell within reach. 0533 /53    0730 Bedside and Verbal shift change report given to [facility], RN (oncoming nurse) by Tye root RN (offgoing nurse). Report included the following information SBAR, Kardex, Intake/Output, MAR, Recent Results and Cardiac Rhythm ST/NSR.

## 2020-04-06 LAB
ALBUMIN SERPL-MCNC: 3.9 G/DL (ref 3.5–5)
ALBUMIN/GLOB SERPL: 0.8 {RATIO} (ref 1.1–2.2)
ALP SERPL-CCNC: 86 U/L (ref 45–117)
ALT SERPL-CCNC: 29 U/L (ref 12–78)
ANION GAP SERPL CALC-SCNC: 8 MMOL/L (ref 5–15)
APPEARANCE UR: CLEAR
AST SERPL-CCNC: 25 U/L (ref 15–37)
B PERT DNA SPEC QL NAA+PROBE: NOT DETECTED
BACTERIA URNS QL MICRO: NEGATIVE /HPF
BILIRUB DIRECT SERPL-MCNC: 0.1 MG/DL (ref 0–0.2)
BILIRUB SERPL-MCNC: 0.5 MG/DL (ref 0.2–1)
BILIRUB UR QL CFM: NEGATIVE
BILIRUB UR QL: NEGATIVE
BORDETELLA PARAPERTUSSIS PCR, BORPAR: NOT DETECTED
BUN SERPL-MCNC: 5 MG/DL (ref 6–20)
BUN/CREAT SERPL: 7 (ref 12–20)
C PNEUM DNA SPEC QL NAA+PROBE: NOT DETECTED
CALCIUM SERPL-MCNC: 8.9 MG/DL (ref 8.5–10.1)
CHLORIDE SERPL-SCNC: 105 MMOL/L (ref 97–108)
CO2 SERPL-SCNC: 24 MMOL/L (ref 21–32)
COLOR UR: NORMAL
COMMENT, HOLDF: NORMAL
CREAT SERPL-MCNC: 0.68 MG/DL (ref 0.55–1.02)
EPITH CASTS URNS QL MICRO: NORMAL /LPF
ERYTHROCYTE [DISTWIDTH] IN BLOOD BY AUTOMATED COUNT: 15.5 % (ref 11.5–14.5)
EST. AVERAGE GLUCOSE BLD GHB EST-MCNC: 120 MG/DL
FLUAV H1 2009 PAND RNA SPEC QL NAA+PROBE: NOT DETECTED
FLUAV H1 RNA SPEC QL NAA+PROBE: NOT DETECTED
FLUAV H3 RNA SPEC QL NAA+PROBE: NOT DETECTED
FLUAV SUBTYP SPEC NAA+PROBE: NOT DETECTED
FLUBV RNA SPEC QL NAA+PROBE: NOT DETECTED
GLOBULIN SER CALC-MCNC: 4.7 G/DL (ref 2–4)
GLUCOSE SERPL-MCNC: 145 MG/DL (ref 65–100)
GLUCOSE UR STRIP.AUTO-MCNC: NEGATIVE MG/DL
HADV DNA SPEC QL NAA+PROBE: NOT DETECTED
HBA1C MFR BLD: 5.8 % (ref 4–5.6)
HCOV 229E RNA SPEC QL NAA+PROBE: NOT DETECTED
HCOV HKU1 RNA SPEC QL NAA+PROBE: NOT DETECTED
HCOV NL63 RNA SPEC QL NAA+PROBE: NOT DETECTED
HCOV OC43 RNA SPEC QL NAA+PROBE: NOT DETECTED
HCT VFR BLD AUTO: 38.4 % (ref 35–47)
HGB BLD-MCNC: 11.9 G/DL (ref 11.5–16)
HGB UR QL STRIP: NEGATIVE
HMPV RNA SPEC QL NAA+PROBE: NOT DETECTED
HPIV1 RNA SPEC QL NAA+PROBE: NOT DETECTED
HPIV2 RNA SPEC QL NAA+PROBE: NOT DETECTED
HPIV3 RNA SPEC QL NAA+PROBE: NOT DETECTED
HPIV4 RNA SPEC QL NAA+PROBE: NOT DETECTED
KETONES UR QL STRIP.AUTO: NEGATIVE MG/DL
LEUKOCYTE ESTERASE UR QL STRIP.AUTO: NEGATIVE
M PNEUMO DNA SPEC QL NAA+PROBE: NOT DETECTED
MAGNESIUM SERPL-MCNC: 2.3 MG/DL (ref 1.6–2.4)
MCH RBC QN AUTO: 24.5 PG (ref 26–34)
MCHC RBC AUTO-ENTMCNC: 31 G/DL (ref 30–36.5)
MCV RBC AUTO: 79 FL (ref 80–99)
NITRITE UR QL STRIP.AUTO: NEGATIVE
NRBC # BLD: 0 K/UL (ref 0–0.01)
NRBC BLD-RTO: 0 PER 100 WBC
PH UR STRIP: 6.5 [PH] (ref 5–8)
PHOSPHATE SERPL-MCNC: 1.7 MG/DL (ref 2.6–4.7)
PLATELET # BLD AUTO: 360 K/UL (ref 150–400)
PMV BLD AUTO: 9.7 FL (ref 8.9–12.9)
POTASSIUM SERPL-SCNC: 4.1 MMOL/L (ref 3.5–5.1)
PROT SERPL-MCNC: 8.6 G/DL (ref 6.4–8.2)
PROT UR STRIP-MCNC: NEGATIVE MG/DL
RBC # BLD AUTO: 4.86 M/UL (ref 3.8–5.2)
RBC #/AREA URNS HPF: NORMAL /HPF (ref 0–5)
RSV RNA SPEC QL NAA+PROBE: NOT DETECTED
RV+EV RNA SPEC QL NAA+PROBE: NOT DETECTED
SAMPLES BEING HELD,HOLD: NORMAL
SODIUM SERPL-SCNC: 137 MMOL/L (ref 136–145)
SP GR UR REFRACTOMETRY: 1.01 (ref 1–1.03)
UA: UC IF INDICATED,UAUC: NORMAL
UROBILINOGEN UR QL STRIP.AUTO: 0.2 EU/DL (ref 0.2–1)
WBC # BLD AUTO: 14.3 K/UL (ref 3.6–11)
WBC URNS QL MICRO: NORMAL /HPF (ref 0–4)

## 2020-04-06 PROCEDURE — 74011636637 HC RX REV CODE- 636/637: Performed by: INTERNAL MEDICINE

## 2020-04-06 PROCEDURE — 94640 AIRWAY INHALATION TREATMENT: CPT

## 2020-04-06 PROCEDURE — 84100 ASSAY OF PHOSPHORUS: CPT

## 2020-04-06 PROCEDURE — 65660000000 HC RM CCU STEPDOWN

## 2020-04-06 PROCEDURE — 36415 COLL VENOUS BLD VENIPUNCTURE: CPT

## 2020-04-06 PROCEDURE — 81001 URINALYSIS AUTO W/SCOPE: CPT

## 2020-04-06 PROCEDURE — 85027 COMPLETE CBC AUTOMATED: CPT

## 2020-04-06 PROCEDURE — 74011000258 HC RX REV CODE- 258: Performed by: INTERNAL MEDICINE

## 2020-04-06 PROCEDURE — 74011250637 HC RX REV CODE- 250/637: Performed by: INTERNAL MEDICINE

## 2020-04-06 PROCEDURE — 87449 NOS EACH ORGANISM AG IA: CPT

## 2020-04-06 PROCEDURE — 80076 HEPATIC FUNCTION PANEL: CPT

## 2020-04-06 PROCEDURE — 94664 DEMO&/EVAL PT USE INHALER: CPT

## 2020-04-06 PROCEDURE — 83036 HEMOGLOBIN GLYCOSYLATED A1C: CPT

## 2020-04-06 PROCEDURE — 80048 BASIC METABOLIC PNL TOTAL CA: CPT

## 2020-04-06 PROCEDURE — 74011250636 HC RX REV CODE- 250/636: Performed by: INTERNAL MEDICINE

## 2020-04-06 PROCEDURE — 83735 ASSAY OF MAGNESIUM: CPT

## 2020-04-06 RX ORDER — PREDNISONE 20 MG/1
TABLET ORAL
Qty: 7 TAB | Refills: 0 | Status: SHIPPED | OUTPATIENT
Start: 2020-04-06 | End: 2020-04-23

## 2020-04-06 RX ORDER — SODIUM CHLORIDE 0.9 % (FLUSH) 0.9 %
5-40 SYRINGE (ML) INJECTION EVERY 8 HOURS
Status: DISCONTINUED | OUTPATIENT
Start: 2020-04-06 | End: 2020-04-07 | Stop reason: HOSPADM

## 2020-04-06 RX ORDER — HYDROMORPHONE HYDROCHLORIDE 1 MG/ML
0.5 INJECTION, SOLUTION INTRAMUSCULAR; INTRAVENOUS; SUBCUTANEOUS
Status: DISCONTINUED | OUTPATIENT
Start: 2020-04-06 | End: 2020-04-07 | Stop reason: HOSPADM

## 2020-04-06 RX ORDER — PREDNISONE 20 MG/1
60 TABLET ORAL
Status: DISCONTINUED | OUTPATIENT
Start: 2020-04-06 | End: 2020-04-07 | Stop reason: HOSPADM

## 2020-04-06 RX ORDER — DIPHENHYDRAMINE HYDROCHLORIDE 50 MG/ML
12.5 INJECTION, SOLUTION INTRAMUSCULAR; INTRAVENOUS
Status: DISCONTINUED | OUTPATIENT
Start: 2020-04-06 | End: 2020-04-07 | Stop reason: HOSPADM

## 2020-04-06 RX ORDER — CEFDINIR 300 MG/1
300 CAPSULE ORAL 2 TIMES DAILY
Qty: 8 CAP | Refills: 0 | Status: SHIPPED | OUTPATIENT
Start: 2020-04-06 | End: 2020-04-10

## 2020-04-06 RX ORDER — PROCHLORPERAZINE EDISYLATE 5 MG/ML
10 INJECTION INTRAMUSCULAR; INTRAVENOUS
Status: DISCONTINUED | OUTPATIENT
Start: 2020-04-06 | End: 2020-04-07 | Stop reason: HOSPADM

## 2020-04-06 RX ORDER — AZITHROMYCIN 250 MG/1
500 TABLET, FILM COATED ORAL DAILY
Qty: 8 TAB | Refills: 0 | Status: SHIPPED | OUTPATIENT
Start: 2020-04-06 | End: 2020-04-10

## 2020-04-06 RX ORDER — SODIUM CHLORIDE 9 MG/ML
75 INJECTION, SOLUTION INTRAVENOUS CONTINUOUS
Status: DISPENSED | OUTPATIENT
Start: 2020-04-06 | End: 2020-04-07

## 2020-04-06 RX ORDER — LORATADINE 10 MG/1
10 TABLET ORAL DAILY
Status: DISCONTINUED | OUTPATIENT
Start: 2020-04-06 | End: 2020-04-07 | Stop reason: HOSPADM

## 2020-04-06 RX ORDER — ALBUTEROL SULFATE 90 UG/1
2 AEROSOL, METERED RESPIRATORY (INHALATION)
Status: DISCONTINUED | OUTPATIENT
Start: 2020-04-06 | End: 2020-04-06

## 2020-04-06 RX ORDER — DOCUSATE SODIUM 100 MG/1
100 CAPSULE, LIQUID FILLED ORAL 2 TIMES DAILY
Status: DISCONTINUED | OUTPATIENT
Start: 2020-04-06 | End: 2020-04-07 | Stop reason: HOSPADM

## 2020-04-06 RX ORDER — SODIUM CHLORIDE 0.9 % (FLUSH) 0.9 %
5-40 SYRINGE (ML) INJECTION AS NEEDED
Status: DISCONTINUED | OUTPATIENT
Start: 2020-04-06 | End: 2020-04-07 | Stop reason: HOSPADM

## 2020-04-06 RX ORDER — HYDROCODONE BITARTRATE AND ACETAMINOPHEN 5; 325 MG/1; MG/1
1 TABLET ORAL
Status: DISCONTINUED | OUTPATIENT
Start: 2020-04-06 | End: 2020-04-07 | Stop reason: HOSPADM

## 2020-04-06 RX ORDER — AMLODIPINE BESYLATE 5 MG/1
5 TABLET ORAL DAILY
Qty: 30 TAB | Refills: 0 | Status: SHIPPED | OUTPATIENT
Start: 2020-04-07 | End: 2020-10-29

## 2020-04-06 RX ORDER — ALBUTEROL SULFATE 90 UG/1
2 AEROSOL, METERED RESPIRATORY (INHALATION)
Status: DISCONTINUED | OUTPATIENT
Start: 2020-04-06 | End: 2020-04-07 | Stop reason: HOSPADM

## 2020-04-06 RX ORDER — NALOXONE HYDROCHLORIDE 0.4 MG/ML
0.4 INJECTION, SOLUTION INTRAMUSCULAR; INTRAVENOUS; SUBCUTANEOUS AS NEEDED
Status: DISCONTINUED | OUTPATIENT
Start: 2020-04-06 | End: 2020-04-07 | Stop reason: HOSPADM

## 2020-04-06 RX ORDER — SODIUM CHLORIDE 9 MG/ML
100 INJECTION, SOLUTION INTRAVENOUS CONTINUOUS
Status: DISCONTINUED | OUTPATIENT
Start: 2020-04-06 | End: 2020-04-06

## 2020-04-06 RX ORDER — ENOXAPARIN SODIUM 100 MG/ML
40 INJECTION SUBCUTANEOUS EVERY 12 HOURS
Status: DISCONTINUED | OUTPATIENT
Start: 2020-04-06 | End: 2020-04-07 | Stop reason: HOSPADM

## 2020-04-06 RX ORDER — ACETAMINOPHEN 325 MG/1
650 TABLET ORAL
Status: DISCONTINUED | OUTPATIENT
Start: 2020-04-06 | End: 2020-04-07 | Stop reason: HOSPADM

## 2020-04-06 RX ADMIN — GUAIFENESIN 600 MG: 600 TABLET ORAL at 21:15

## 2020-04-06 RX ADMIN — ENOXAPARIN SODIUM 40 MG: 40 INJECTION SUBCUTANEOUS at 21:13

## 2020-04-06 RX ADMIN — DOCUSATE SODIUM 100 MG: 100 CAPSULE, LIQUID FILLED ORAL at 09:21

## 2020-04-06 RX ADMIN — GUAIFENESIN 600 MG: 600 TABLET ORAL at 09:21

## 2020-04-06 RX ADMIN — AZITHROMYCIN MONOHYDRATE 500 MG: 500 INJECTION, POWDER, LYOPHILIZED, FOR SOLUTION INTRAVENOUS at 21:13

## 2020-04-06 RX ADMIN — Medication 10 ML: at 01:00

## 2020-04-06 RX ADMIN — ALBUTEROL SULFATE 2 PUFF: 90 AEROSOL, METERED RESPIRATORY (INHALATION) at 21:11

## 2020-04-06 RX ADMIN — Medication 10 ML: at 05:29

## 2020-04-06 RX ADMIN — Medication 10 ML: at 05:30

## 2020-04-06 RX ADMIN — CEFTRIAXONE 2 G: 2 INJECTION, POWDER, FOR SOLUTION INTRAMUSCULAR; INTRAVENOUS at 18:41

## 2020-04-06 RX ADMIN — PREDNISONE 60 MG: 20 TABLET ORAL at 13:41

## 2020-04-06 RX ADMIN — ALBUTEROL SULFATE 2 PUFF: 90 AEROSOL, METERED RESPIRATORY (INHALATION) at 09:19

## 2020-04-06 RX ADMIN — ALBUTEROL SULFATE 2 PUFF: 90 AEROSOL, METERED RESPIRATORY (INHALATION) at 13:42

## 2020-04-06 RX ADMIN — Medication 10 ML: at 21:17

## 2020-04-06 RX ADMIN — Medication 10 ML: at 14:00

## 2020-04-06 RX ADMIN — LORATADINE 10 MG: 10 TABLET ORAL at 09:21

## 2020-04-06 RX ADMIN — ALBUTEROL SULFATE 2 PUFF: 90 AEROSOL, METERED RESPIRATORY (INHALATION) at 05:29

## 2020-04-06 RX ADMIN — ENOXAPARIN SODIUM 40 MG: 40 INJECTION SUBCUTANEOUS at 09:21

## 2020-04-06 NOTE — PROGRESS NOTES
Pt seen and examined. Feels improved. Full note to follow. Wheezing on exam. Will start PO steroids. Hold on nebulized treatments at [address] rule out.  Will assess respiratory status after steroids but if NOT hypoxic and feels well with exertion may be able to d.c

## 2020-04-06 NOTE — PROGRESS NOTES
Physical Therapy  4/6/2020    Orders received, chart reviewed and patient is currently under investigation for COVID-19. In attempts to have only essential personnel enter the room and conserve PPE, we will confer with nursing and/or the referring provider to determine the most appropriate timing of our therapy intervention. Until this time, we will follow the patient peripherally to support nursing staff on an appropriate care plan. Spoke with RN who reports pt is up ad omar within her room. Ambulating to the bathroom without difficulty and sitting up on the EOB independently. Provided RN with exercise program for pt to complete as able. Recommend pt up to the bathroom for toileting and sitting up in the chair for all meals. Patient appears close to her baseline. Will continue to follow as needed. Thank you for your assistance.    Laureano Soria, PT, DPT

## 2020-04-06 NOTE — H&P
Sae Hudson sunshine Malone [address]  [address] [facility], [place], [address] [place] [address]  [phone]    Admission History and Physical      NAME:  Doc Aparicio   :   1985   MRN:  [id number]     PCP:  Vickie See MD     Date/Time of service:  2020  8:24 PM        Subjective:     CHIEF COMPLAINT: dyspnea, cough     HISTORY OF PRESENT ILLNESS:     The patient is a 28 yo hx of asthma, morbid obesity, presented w/ cough, weakness, pneumonia. The patient c/o a productive cough 3 days ago. She was diagnosed with \"bronchitis\" by an OSH and sent home with albuterol. Since then, she has c/o worsening dyspnea, weakness, fatigue. The patient denied HA, fevers, chills, nausea, vomiting, diarrhea, sick contacts. She works as a  but has been home for 2 weeks. In the ED, WBC was 11.7, temp 98.1. Chest CT neg for PE, but showed patchy RLL pneumonia. No Known Allergies    Prior to Admission medications    Medication Sig Start Date End Date Taking? Authorizing Provider   albuterol (PROVENTIL VENTOLIN) 2.5 mg /3 mL (0.083 %) nebu 2.5 mg by Nebulization route every four (4) hours as needed for Wheezing. Yes Provider, Historical   albuterol (PROVENTIL HFA, VENTOLIN HFA, PROAIR HFA) 90 mcg/actuation inhaler Take 2 Puffs by inhalation every four (4) hours as needed for Wheezing. Yes Provider, Historical   acetaminophen (TYLENOL) 325 mg tablet Take 650 mg by mouth every six (6) hours as needed for Pain. Yes Provider, Historical   fexofenadine (ALLEGRA) 180 mg tablet Take 180 mg by mouth daily.    Yes Provider, Historical       Past Medical History:   Diagnosis Date    HPV (human papilloma virus) infection     Ill-defined condition     \"Bronchitis\"    Trichomonas         Past Surgical History:   Procedure Laterality Date    HX OTHER SURGICAL      vaginal wart removal by laser    HX POLYPECTOMY  2018    HX WISDOM TEETH EXTRACTION         Social History     Tobacco Use    Smoking status: Current Every Day Smoker     Packs/day: 0.25    Smokeless tobacco: Never Used   Substance Use Topics    Alcohol use: Yes     Comment: socially        Family History   Problem Relation Age of Onset    Hypertension Mother     Hypertension Father     Diabetes Sister     Diabetes Paternal Grandmother         Review of Systems:  (bold if positive, if negative)    Gen:  Eyes:  ENT:  CVS:  Pulm:  Cough, dyspneaGI:  :  MS:  Skin:  Psych:  Endo:  Hem:  Renal:  Neuro:          Objective:      VITALS:    Vital signs reviewed; most recent are:    Visit Vitals  BP (!) 197/106 (BP 1 Location: Left arm, BP Patient Position: Post activity)   Pulse 91   Temp 99.3 °F (37.4 °C)   Resp 22   Wt (!) 186 kg (410 lb)   SpO2 95%   BMI 70.38 kg/m²     SpO2 Readings from Last 6 Encounters:   04/05/20 95%   08/19/19 99%   07/02/19 99%   06/02/18 97%   09/27/12 100%   03/05/12 99%    O2 Flow Rate (L/min): 2 l/min       Intake/Output Summary (Last 24 hours) at 4/5/2020 2024  Last data filed at 4/5/2020 1608  Gross per 24 hour   Intake 50 ml   Output    Net 50 ml        Exam:     Physical Exam:    Gen:  Well-developed, well-nourished, morbidly obese, in no acute distress  HEENT:  Pink conjunctivae, PERRL, hearing intact to voice, moist mucous membranes  Neck:  Supple, without masses, thyroid non-tender  Resp:  No accessory muscle use, clear breath sounds without wheezes rales or rhonchi  Card:  No murmurs, normal S1, S2 without thrills, bruits or peripheral edema  Abd:  Soft, non-tender, non-distended, normoactive bowel sounds are present  Lymph:  No cervical adenopathy  Musc:  No cyanosis or clubbing  Skin:  No rashes   Neuro:  Cranial nerves 3-12 are grossly intact, follows commands appropriately  Psych:  Alert with good insight.   Oriented to person, place, and time    Labs:    Recent Labs     04/05/20  1456   WBC 11.7*   HGB 11.8   HCT 39.1        Recent Labs     04/05/20  1456      K 4.3      CO2 29   GLU 97   BUN 4*   CREA 0.69   CA 8.8   ALB 3.8   TBILI 0.4   SGOT 24   ALT 30     No results found for: GLUCPOC  No results for input(s): PH, PCO2, PO2, HCO3, FIO2 in the last 72 hours. No results for input(s): INR, INREXT in the last 72 hours. Radiology and EKG reviewed:   Chest CT reviewed    **Old Records reviewed in [facility]**       Assessment/Plan:       Principal Problem:    28 yo hx of asthma, morbid obesity, presented w/ cough, weakness, pneumonia    1) Cough/Community acquired pneumonia: no fevers, HA. Low suspicion for COVID-19, but will test anyway. Will also send viral panel. Empirically start on IV CTX/azithro, incentive spirometry, guaifenesin, nebs prn    2) Asthma: minimal wheezing. Hold off on steroids. Use inhalers prn    3) Uncontrolled HTN: no prior hx of HTN.   Will start norvasc, use IV hydralazine prn    4) Morbid obesity: will  on wt loss    Risk of deterioration: high      Total time spent with patient: 79 Minutes **I personally saw and examined the patient during this time period**                 Care Plan discussed with: Patient, nursing    Discussed:  Care Plan    Prophylaxis:  Lovenox    Probable Disposition:  Home w/Family           ___________________________________________________    Attending Physician: Margarito Jordan MD

## 2020-04-06 NOTE — PROGRESS NOTES
Patient is R/O COVID-19, patient interview/assessment via phone. No contact with patient. Reason for Admission:   Pneumonia, cough, weakness. Hx asthma, obesity. RUR Score:  8%/low                   Plan for utilizing home health:   None at this time, no prior use of home health services, DME: nebulizer. PCP: First and Last name:  Dr. Humberto Forrester   Name of Practice:    Are you a current patient: Yes/No: yes   Approximate date of last visit:  8 months ago                    Current Advanced Directive/Advance Care Plan: not on file, next of kin is mother Bettye Needs [phone]. Transition of Care Plan:       Chart reviewed, demographics verified. CM role and follow up discussed. Patient lives with her parents. Patient lives in a one story home with 2 steps to enter home. Patient has prescription drug coverage, uses [facility]  pharmacy in [place]. Patient is independent, drives, and provides self care. PLAN:  1. Monitor patient response to treatment and recommendations. 2. Await further evaluation. 3. Patient is likely home with family assist.  4. Patient transport home per family at discharge. 4. CM to monitor for discharge needs.     Care Management Interventions  PCP Verified by CM: Yes(Dr. Humberto Forrester)  Transition of Care Consult (CM Consult): Discharge Planning  Physical Therapy Consult: Yes  Current Support Network: Lives with Caregiver(lives with parents)  Confirm Follow Up Transport: Family  The Plan for Transition of Care is Related to the Following Treatment Goals : Discharge  Discharge Location  Discharge Placement: Home with family assistance      Clif Wen, RN, MSN, Care manager

## 2020-04-06 NOTE — PROGRESS NOTES
Admission Medication Reconciliation:     Information obtained from:    Pharmacist called room 335 and the interview was conducted over the phone. RxQuery data available¹:  YES    Comments/Recommendations:   Updated PTA medication list  Verified preferred pharmacy  Reviewed patient's allergies     ¹RxQuery pharmacy benefit data reflects medications filled and processed through the patient's insurance, however   this data does NOT capture whether the medication was picked up or is currently being taken by the patient. Prior to Admission Medications   Prescriptions Last Dose Informant Taking?   acetaminophen (TYLENOL) 325 mg tablet  Self Yes   Sig: Take 650 mg by mouth every six (6) hours as needed for Pain. albuterol (PROVENTIL HFA, VENTOLIN HFA, PROAIR HFA) 90 mcg/actuation inhaler  Self Yes   Sig: Take 2 Puffs by inhalation every four (4) hours as needed for Wheezing. albuterol (PROVENTIL VENTOLIN) 2.5 mg /3 mL (0.083 %) nebu  Self Yes   Si.5 mg by Nebulization route every four (4) hours as needed for Wheezing. fexofenadine (ALLEGRA) 180 mg tablet 2020 at Unknown time Self Yes   Sig: Take 180 mg by mouth daily. Facility-Administered Medications: None         Please contact the main inpatient pharmacy with any questions or concerns at [phone] and we will direct you to the clinical pharmacist covering this patient's care while in-house.    Reymundo Alcocer, PharmD, BCPS

## 2020-04-06 NOTE — PROGRESS NOTES
Prescriptions for discharge called into [facility] pharmacy on [address] at [address]

## 2020-04-06 NOTE — PROGRESS NOTES
How Severe Is Your Skin Lesion?: moderate [facility] Dosing Services:      Automatic adjustment of Lovenox for BMI over 36 (79)  Consult made for this 29 y.o. female, for prophylaxis of  dvt. Wt Readings from Last 1 Encounters:   04/05/20 (!) 186 kg (410 lb)       Ht Readings from Last 1 Encounters:   08/19/19 162.6 cm (64\")           Previous Dose 40 mg sc daily   Creatinine Clearance CrCl cannot be calculated (Unknown ideal weight.). Creatinine Lab Results   Component Value Date/Time    Creatinine 0.68 04/06/2020 12:50 AM       Platelet Lab Results   Component Value Date/Time    PLATELET 593 66/11/5692 12:50 AM      H/H Lab Results   Component Value Date/Time    HGB 11.9 04/06/2020 12:50 AM           Pharmacist made change to enoxaparin therapy based on:     [ x ] BMI: dose changed to: 40 mg sc every 12 hours    - Pharmacy to automatically make dose adjustment for renal dysfunction (creatinine clearance less than 30 mL/min)  - Pharmacy to automatically make dose adjustment for obesity (BMI greater than 40)  - Pharmacy to make dose rounding adjustments per [facility] dose adjustment scale. Pharmacy to monitor patients progress. Will make dose adjustment as needed per changing renal function. Will communicate further recommendations regarding patients anticoagulation therapy with prescriber. Signed Jessie Enriquez .  Contact information: [phone] Has Your Skin Lesion Been Treated?: not been treated Is This A New Presentation, Or A Follow-Up?: Skin Lesions

## 2020-04-06 NOTE — PROGRESS NOTES
Problem: General Infection Care Plan (Adult and Pediatric)  Goal: Improvement in signs and symptoms of infection  Outcome: Progressing Towards Goal  Goal: *Optimize nutritional status  Outcome: Progressing Towards Goal     Problem: Patient Education: Go to Patient Education Activity  Goal: Patient/Family Education  Outcome: Progressing Towards Goal     Problem: Airway Clearance - Ineffective  Goal: *Patent airway  Outcome: Progressing Towards Goal  Goal: *Absence of airway secretions  Outcome: Progressing Towards Goal  Goal: *Able to cough effectively  Outcome: Progressing Towards Goal  Goal: *PALLIATIVE CARE:  Alleviation of secretions, cough and/or nasal congestion  Outcome: Progressing Towards Goal     Problem: Patient Education: Go to Patient Education Activity  Goal: Patient/Family Education  Outcome: Progressing Towards Goal

## 2020-04-06 NOTE — PROGRESS NOTES
Bedside shift change [facility] by Shannon Chávez (offgoing nurse). Report included the following information SBAR, Kardex, ED Summary, Intake/Output, MAR, Recent Results and Cardiac Rhythm NSR.     0900 Spoken to Dr Sherry Rick regarding ordered IV fluids. Per Dr Dr Sherry Rick, hold IV fluids as long as patient is eating and drinking. 1040 Respiratory panel negative. 1230 Per Lab, sample hemolyzed. [address] Per Dr Sherry Rick, give Po steroids now. At 3 pm walk around the room on RA. If feeling good and maintaining SpO2 above 90%, Pt may go home. 1530 Pt is on room air.    1602 Pt complaining of SOB while resting. 2 L NC. Dr Sherry Rick notified. Bedside shift change report given to Marychuy (oncoming nurse) by Pati Vaughn (offgoing nurse). Report included the following information SBAR, Kardex, Intake/Output, MAR, Recent Results and Cardiac Rhythm Sinus tachy.

## 2020-04-07 VITALS
WEIGHT: 293 LBS | RESPIRATION RATE: 20 BRPM | OXYGEN SATURATION: 92 % | DIASTOLIC BLOOD PRESSURE: 80 MMHG | SYSTOLIC BLOOD PRESSURE: 159 MMHG | HEIGHT: 64 IN | TEMPERATURE: 99.1 F | HEART RATE: 84 BPM | BODY MASS INDEX: 50.02 KG/M2

## 2020-04-07 LAB
ANION GAP SERPL CALC-SCNC: 6 MMOL/L (ref 5–15)
BASOPHILS # BLD: 0 K/UL (ref 0–0.1)
BASOPHILS NFR BLD: 0 % (ref 0–1)
BNP SERPL-MCNC: 18 PG/ML
BUN SERPL-MCNC: 11 MG/DL (ref 6–20)
BUN/CREAT SERPL: 16 (ref 12–20)
CALCIUM SERPL-MCNC: 8.3 MG/DL (ref 8.5–10.1)
CHLORIDE SERPL-SCNC: 108 MMOL/L (ref 97–108)
CO2 SERPL-SCNC: 24 MMOL/L (ref 21–32)
CREAT SERPL-MCNC: 0.67 MG/DL (ref 0.55–1.02)
DIFFERENTIAL METHOD BLD: ABNORMAL
EOSINOPHIL # BLD: 0.1 K/UL (ref 0–0.4)
EOSINOPHIL NFR BLD: 1 % (ref 0–7)
ERYTHROCYTE [DISTWIDTH] IN BLOOD BY AUTOMATED COUNT: 15.7 % (ref 11.5–14.5)
GLUCOSE SERPL-MCNC: 159 MG/DL (ref 65–100)
HCT VFR BLD AUTO: 36.8 % (ref 35–47)
HGB BLD-MCNC: 11.2 G/DL (ref 11.5–16)
IMM GRANULOCYTES # BLD AUTO: 0.1 K/UL (ref 0–0.04)
IMM GRANULOCYTES NFR BLD AUTO: 1 % (ref 0–0.5)
LYMPHOCYTES # BLD: 2.4 K/UL (ref 0.8–3.5)
LYMPHOCYTES NFR BLD: 18 % (ref 12–49)
MCH RBC QN AUTO: 24.3 PG (ref 26–34)
MCHC RBC AUTO-ENTMCNC: 30.4 G/DL (ref 30–36.5)
MCV RBC AUTO: 79.8 FL (ref 80–99)
MONOCYTES # BLD: 0.7 K/UL (ref 0–1)
MONOCYTES NFR BLD: 6 % (ref 5–13)
NEUTS SEG # BLD: 10 K/UL (ref 1.8–8)
NEUTS SEG NFR BLD: 74 % (ref 32–75)
NRBC # BLD: 0 K/UL (ref 0–0.01)
NRBC BLD-RTO: 0 PER 100 WBC
PLATELET # BLD AUTO: 346 K/UL (ref 150–400)
PMV BLD AUTO: 9.2 FL (ref 8.9–12.9)
POTASSIUM SERPL-SCNC: 3.9 MMOL/L (ref 3.5–5.1)
RBC # BLD AUTO: 4.61 M/UL (ref 3.8–5.2)
SODIUM SERPL-SCNC: 138 MMOL/L (ref 136–145)
WBC # BLD AUTO: 13.2 K/UL (ref 3.6–11)

## 2020-04-07 PROCEDURE — 86738 MYCOPLASMA ANTIBODY: CPT

## 2020-04-07 PROCEDURE — 74011250636 HC RX REV CODE- 250/636: Performed by: INTERNAL MEDICINE

## 2020-04-07 PROCEDURE — 74011636637 HC RX REV CODE- 636/637: Performed by: INTERNAL MEDICINE

## 2020-04-07 PROCEDURE — 80048 BASIC METABOLIC PNL TOTAL CA: CPT

## 2020-04-07 PROCEDURE — 36415 COLL VENOUS BLD VENIPUNCTURE: CPT

## 2020-04-07 PROCEDURE — 74011250637 HC RX REV CODE- 250/637: Performed by: INTERNAL MEDICINE

## 2020-04-07 PROCEDURE — 85025 COMPLETE CBC W/AUTO DIFF WBC: CPT

## 2020-04-07 PROCEDURE — 83880 ASSAY OF NATRIURETIC PEPTIDE: CPT

## 2020-04-07 PROCEDURE — 94640 AIRWAY INHALATION TREATMENT: CPT

## 2020-04-07 RX ADMIN — Medication 10 ML: at 06:55

## 2020-04-07 RX ADMIN — AMLODIPINE BESYLATE 5 MG: 5 TABLET ORAL at 08:13

## 2020-04-07 RX ADMIN — ALBUTEROL SULFATE 2 PUFF: 90 AEROSOL, METERED RESPIRATORY (INHALATION) at 14:00

## 2020-04-07 RX ADMIN — PREDNISONE 60 MG: 20 TABLET ORAL at 08:13

## 2020-04-07 RX ADMIN — ENOXAPARIN SODIUM 40 MG: 40 INJECTION SUBCUTANEOUS at 08:13

## 2020-04-07 RX ADMIN — ALBUTEROL SULFATE 2 PUFF: 90 AEROSOL, METERED RESPIRATORY (INHALATION) at 08:00

## 2020-04-07 RX ADMIN — GUAIFENESIN 600 MG: 600 TABLET ORAL at 08:14

## 2020-04-07 RX ADMIN — LORATADINE 10 MG: 10 TABLET ORAL at 08:14

## 2020-04-07 RX ADMIN — ALBUTEROL SULFATE 2 PUFF: 90 AEROSOL, METERED RESPIRATORY (INHALATION) at 02:00

## 2020-04-07 NOTE — PROGRESS NOTES
Appointment Information  The following appointments have been successfully scheduled:    Date/time Friday, April 10, 2020 09:00 AM  Patient Isabella Kothari 1985 ([id number] F) #[id number]  Department [facility] OFFICE-PCP  Appointment type Transitional Care  Provider [facility] TAMANNA

## 2020-04-07 NOTE — PROGRESS NOTES
Noelle provided with PUI note and [facility] fact and info sheet for PUI patients. He is advised to ensure patient reads these documents over and understands them prior to the patient leaving.

## 2020-04-07 NOTE — PROGRESS NOTES
Sae Hudson [facility] 79  [address]  [phone]      Medical Progress Note      NAME: James Bland   :  1985  MRM:  [id number]    Date/Time: 2020  8:44 PM         Subjective:     Chief Complaint:  \"I'm a little better\"    PT seen and examined. Less SOB than admission. (+) cough productive of intermittent sputum. Has been ambulating but feels SOB     ROS:  (bold if positive, if negative)      SOB  Cough        Objective:       Vitals:          Last 24hrs VS reviewed since prior progress note. Most recent are:    Visit Vitals  /76 (BP 1 Location: Right arm, BP Patient Position: At rest)   Pulse (!) 110   Temp 99.3 °F (37.4 °C)   Resp 25   Wt (!) 182.1 kg (401 lb 8 oz)   SpO2 94%   Breastfeeding No   BMI 68.92 kg/m²     SpO2 Readings from Last 6 Encounters:   20 94%   19 99%   19 99%   18 97%   12 100%   12 99%    O2 Flow Rate (L/min): 2 l/min       Intake/Output Summary (Last 24 hours) at 2020  Last data filed at 2020 1200  Gross per 24 hour   Intake 3405.83 ml   Output 750 ml   Net 2655.83 ml          Exam:     Physical Exam:    Gen:  Well-developed, well-nourished, in no acute distress  HEENT:  Pink conjunctivae, PERRL, hearing intact to voice, moist mucous membranes  Neck:  Supple, without masses, thyroid non-tender  Resp: Diffuse expiratory wheezing   Card: Tachycardic.  No murmurs, normal S1, S2 without thrills, bruits or peripheral edema  Abd:  Soft, non-tender, non-distended, normoactive bowel sounds are present  Musc:  No cyanosis or clubbing  Skin:  No rashes or ulcers, skin turgor is good  Neuro:  Cranial nerves 3-12 are grossly intact,  strength is 5/5 bilaterally and dorsi / plantarflexion is 5/5 bilaterally, follows commands appropriately  Psych:  Good insight, oriented to person, place and time, alert  Morbidly obese     Medications Reviewed: (see below)    Lab Data Reviewed: (see below)    ______________________________________________________________________    Medications:     Current Facility-Administered Medications   Medication Dose Route Frequency    loratadine (CLARITIN) tablet 10 mg  10 mg Oral DAILY    sodium chloride (NS) flush 5-40 mL  5-40 mL IntraVENous Q8H    sodium chloride (NS) flush 5-40 mL  5-40 mL IntraVENous PRN    acetaminophen (TYLENOL) tablet 650 mg  650 mg Oral Q4H PRN    HYDROcodone-acetaminophen (NORCO) 5-325 mg per tablet 1 Tab  1 Tab Oral Q4H PRN    HYDROmorphone (PF) (DILAUDID) injection 0.5 mg  0.5 mg IntraVENous Q4H PRN    naloxone (NARCAN) injection 0.4 mg  0.4 mg IntraVENous PRN    diphenhydrAMINE (BENADRYL) injection 12.5 mg  12.5 mg IntraVENous Q4H PRN    docusate sodium (COLACE) capsule 100 mg  100 mg Oral BID    prochlorperazine (COMPAZINE) injection 10 mg  10 mg IntraVENous Q6H PRN    enoxaparin (LOVENOX) injection 40 mg  40 mg SubCUTAneous Q12H    0.9% sodium chloride infusion  75 mL/hr IntraVENous CONTINUOUS    albuterol (PROVENTIL HFA, VENTOLIN HFA, PROAIR HFA) inhaler 2 Puff  2 Puff Inhalation Q6H RT    predniSONE (DELTASONE) tablet 60 mg  60 mg Oral DAILY WITH BREAKFAST    sodium chloride (NS) flush 5-40 mL  5-40 mL IntraVENous Q8H    sodium chloride (NS) flush 5-40 mL  5-40 mL IntraVENous PRN    guaiFENesin ER (MUCINEX) tablet 600 mg  600 mg Oral Q12H    guaiFENesin-dextromethorphan (ROBITUSSIN DM) 100-10 mg/5 mL syrup 10 mL  10 mL Oral Q6H PRN    cefTRIAXone (ROCEPHIN) 2 g in 0.9% sodium chloride (MBP/ADV) 50 mL  2 g IntraVENous Q24H    amLODIPine (NORVASC) tablet 5 mg  5 mg Oral DAILY    hydrALAZINE (APRESOLINE) 20 mg/mL injection 20 mg  20 mg IntraVENous Q6H PRN    azithromycin (ZITHROMAX) 500 mg in 0.9% sodium chloride (MBP/ADV) 250 mL  500 mg IntraVENous Q24H            Lab Review:     Recent Labs     04/06/20  0050 04/05/20  1456   WBC 14.3* 11.7*   HGB 11.9 11.8   HCT 38.4 39.1    357     Recent Labs 04/06/20  0050 04/05/20  1456    144   K 4.1 4.3    105   CO2 24 29   * 97   BUN 5* 4*   CREA 0.68 0.69   CA 8.9 8.8   MG 2.3  --    PHOS 1.7*  --    ALB 3.9 3.8   SGOT 25 24   ALT 29 30     No components found for: GLPOC         Assessment / Plan:   30 yo hx of asthma, morbid obesity, presented w/ cough, weakness, pneumonia     1) Cough/Community acquired pneumonia: no fevers, HA.   Low suspicion for COVID-19, but test sent on admission  -continue Cef/azithro  -VRP negative   -COVID19 pending   -Legionella/Mycoplasma pending       2) Asthma: now with wheezing   -start prednisone   -albuterol HFA; change to nebs once COVID19 negative      3) Uncontrolled HTN: no prior hx of HTN  -started on amlodipine   -PRN hydralazine     4) Morbid obesity:   -encouraged weight loss     Total time spent with patient: 35 minutes        Care Plan discussed with: Patient     Discussed:  Care plan    Prophylaxis:  Lovenox     Disposition: Home   ______________________________________    Attending Physician: James Espinal MD

## 2020-04-07 NOTE — DISCHARGE INSTRUCTIONS
HOSPITALIST DISCHARGE INSTRUCTIONS  NAME: Doreen Marte   :  1985   MRN:  [id number]     Date/Time:  2020 2:11 PM    ADMIT DATE: 2020     DISCHARGE DATE: 2020    ADMITTING DIAGNOSIS:  Pneumonia   Asthma exacerbation     DISCHARGE DIAGNOSIS:  As above     MEDICATIONS:     · It is important that you take the medication exactly as they are prescribed. · Keep your medication in the bottles provided by the pharmacist and keep a list of the medication names, dosages, and times to be taken in your wallet. · Do not take other medications without consulting your doctor. Pain Management: per above medications    What to do at Home    Recommended diet:  Resume previous diet    Recommended activity: Activity as tolerated    If you experience any of the following symptoms then please call your primary care physician or return to the emergency room if you cannot get hold of your doctor:  Fever, chills, nausea, vomiting, diarrhea, change in mentation, falling, bleeding, shortness of breath, chest pain     Follow Up:  Dr. Dipti Vidal MD  you are to call and set up an appointment to see them in 2 weeks. Information obtained by :  I understand that if any problems occur once I am at home I am to contact my physician. I understand and acknowledge receipt of the instructions indicated above.                                                                                                                                            Physician's or R.N.'s Signature                                                                  Date/Time                                                                                                                                              Patient or Representative Signature                                                          Date/Time

## 2020-04-07 NOTE — PROGRESS NOTES
Bedside shift change report given to [facility] (oncoming nurse) by Hao Avalos (offgoing nurse). Report included the following information SBAR, Kardex, Intake/Output, MAR, Recent Results and Cardiac Rhythm NSR.     1500 Patient was provided with PUI note and [facility] fact and info sheet for PUI patients.

## 2020-04-07 NOTE — PROGRESS NOTES
Problem: General Infection Care Plan (Adult and Pediatric)  Goal: Improvement in signs and symptoms of infection  Outcome: Progressing Towards Goal  Goal: *Optimize nutritional status  Outcome: Progressing Towards Goal     Problem: Patient Education: Go to Patient Education Activity  Goal: Patient/Family Education  Outcome: Progressing Towards Goal     Problem: Airway Clearance - Ineffective  Goal: *Patent airway  Outcome: Progressing Towards Goal  Goal: *Absence of airway secretions  Outcome: Progressing Towards Goal  Goal: *Able to cough effectively  Outcome: Progressing Towards Goal  Goal: *PALLIATIVE CARE:  Alleviation of secretions, cough and/or nasal congestion  Outcome: Progressing Towards Goal     Problem: Patient Education: Go to Patient Education Activity  Goal: Patient/Family Education  Outcome: Progressing Towards Goal     Problem: Falls - Risk of  Goal: *Absence of Falls  Description: Document Eve Fall Risk and appropriate interventions in the flowsheet.   Outcome: Progressing Towards Goal  Note: Fall Risk Interventions:            Medication Interventions: Patient to call before getting OOB, Teach patient to arise slowly                   Problem: Patient Education: Go to Patient Education Activity  Goal: Patient/Family Education  Outcome: Progressing Towards Goal

## 2020-04-08 ENCOUNTER — PATIENT OUTREACH (OUTPATIENT)
Dept: FAMILY MEDICINE CLINIC | Age: 35
End: 2020-04-08

## 2020-04-08 LAB
L PNEUMO1 AG UR QL IA: NEGATIVE
M PNEUMO IGG SER IA-ACNC: 1338 U/ML (ref 0–99)
M PNEUMO IGM SER IA-ACNC: <770 U/ML (ref 0–769)
SPECIMEN SOURCE: NORMAL

## 2020-04-08 NOTE — PROGRESS NOTES
Patient contacted regarding recent discharge and COVID-19 risk   Care Transition Nurse/ Ambulatory Care Manager contacted the patient by telephone to perform post discharge assessment. Verified name and  with patient as identifiers. Patient has following risk factors of: pneumonia. CTN/ACM reviewed discharge instructions, medical action plan and red flags related to discharge diagnosis. Reviewed and educated them on any new and changed medications related to discharge diagnosis. Advised obtaining a 90-day supply of all daily and as-needed medications. Education provided regarding infection prevention, and signs and symptoms of COVID-19 and when to seek medical attention with patient who verbalized understanding. Discussed exposure protocols and quarantine from [address] you at higher risk for severe illness  and given an opportunity for questions and concerns. The patient agrees to contact the COVID-19 hotline [phone] or PCP office for questions related to their healthcare. CTN/ACM provided contact information for future reference. From CDC: Are you at higher risk for severe illness?  Wash your hands often.  Avoid close contact (6 feet, which is about two arm lengths) with people who are sick.  Put distance between yourself and other people if COVID-19 is spreading in your community.  Clean and disinfect frequently touched surfaces.  Avoid all cruise travel and non-essential air travel.  Call your healthcare professional if you have concerns about COVID-19 and your underlying condition or if you are sick. For more information on steps you can take to protect yourself, see CDC's How to Protect Yourself      Patient/family/caregiver given information for Iram Garcia and agrees to enroll yes  Patient's preferred e-mail:  [email or web address]  Patient's preferred phone number: [phone]  Based on Loop alert triggers, patient will be contacted by nurse care manager for worsening symptoms. Plan for follow-up call in 5-7 days based on severity of symptoms and risk factors    *COVID test pending. ACM will track and update patient if possible. Mother works in hospital and cannot return until results are back. Patient has televisit with PCP on 4/10/20.

## 2020-04-10 ENCOUNTER — VIRTUAL VISIT (OUTPATIENT)
Dept: PRIMARY CARE CLINIC | Age: 35
End: 2020-04-10

## 2020-04-10 ENCOUNTER — TELEPHONE (OUTPATIENT)
Dept: PRIMARY CARE CLINIC | Age: 35
End: 2020-04-10

## 2020-04-10 DIAGNOSIS — I10 ESSENTIAL HYPERTENSION: ICD-10-CM

## 2020-04-10 DIAGNOSIS — J18.9 COMMUNITY ACQUIRED PNEUMONIA, UNSPECIFIED LATERALITY: Primary | ICD-10-CM

## 2020-04-10 DIAGNOSIS — B37.31 CANDIDAL VAGINITIS: ICD-10-CM

## 2020-04-10 LAB — COVID-19, XGCOVT: NEGATIVE

## 2020-04-10 RX ORDER — FLUCONAZOLE 150 MG/1
150 TABLET ORAL DAILY
Qty: 1 TAB | Refills: 0 | Status: SHIPPED | OUTPATIENT
Start: 2020-04-10 | End: 2020-04-11

## 2020-04-10 NOTE — TELEPHONE ENCOUNTER
Called [facility] and per lab patient sample was not collected for COVID 19 testing, this is the main reason for the patient to the hospital to be checked         [facility] labs are going to [facility] due the overwhelming about of labs being tested. Patient test results are negative per [facility] lab. Patient would like to know if she could obtain a letter or something that would allow her to back to work.  Please advise

## 2020-04-10 NOTE — PROGRESS NOTES
Consent: Dank Chavarria, who was seen by synchronous (real-time) audio-video technology, and/or her healthcare decision maker, is aware that this patient-initiated, Telehealth encounter on 4/10/2020 is a billable service, with coverage as determined by her insurance carrier. She is aware that she may receive a bill and has provided verbal consent to proceed: Yes. Assessment & Plan:   Diagnoses and all orders for this visit:    1. Community acquired pneumonia, unspecified laterality    2. Candidal vaginitis  -     fluconazole (DIFLUCAN) 150 mg tablet; Take 1 Tab by mouth daily for 1 day. FDA advises cautious prescribing of oral fluconazole in pregnancy. 3. Essential hypertension    low salt diet,    CAP - symptoms resolving., rest,abx, await COVID results. Will call lab for results. Till results available pt angelic self quarantine          712  Subjective:   Dank Chavarria is a 29 y.o. female who was seen for Pneumonia and Hypertension      Prior to Admission medications    Medication Sig Start Date End Date Taking? Authorizing Provider   fluconazole (DIFLUCAN) 150 mg tablet Take 1 Tab by mouth daily for 1 day. FDA advises cautious prescribing of oral fluconazole in pregnancy. 4/10/20 4/11/20 Yes Sharron Reid MD   cefdinir (OMNICEF) 300 mg capsule Take 1 Cap by mouth two (2) times a day for 4 days. 4/6/20 4/10/20  Dedra Colin MD   azithromycin (ZITHROMAX) 250 mg tablet Take 2 Tabs by mouth daily for 4 days. 4/6/20 4/10/20  Dedra Colin MD   amLODIPine (NORVASC) 5 mg tablet Take 1 Tab by mouth daily. 4/7/20   Dedra Colin MD   predniSONE (DELTASONE) 20 mg tablet Please take 40mg x 2 days then 20mg x 2 days then 10mg x 2 days then stop 4/6/20   Carisa Valdez MD   albuterol (PROVENTIL VENTOLIN) 2.5 mg /3 mL (0.083 %) nebu 2.5 mg by Nebulization route every four (4) hours as needed for Wheezing.     Provider, Historical   albuterol (PROVENTIL HFA, VENTOLIN HFA, PROAIR HFA) 90 mcg/actuation inhaler Take 2 Puffs by inhalation every four (4) hours as needed for Wheezing. Provider, Historical   acetaminophen (TYLENOL) 325 mg tablet Take 650 mg by mouth every six (6) hours as needed for Pain. Provider, Historical   fexofenadine (ALLEGRA) 180 mg tablet Take 180 mg by mouth daily. Provider, Historical     No Known Allergies    HIstory. Pt was admitted to hospital on April 5 th with symptoms of cough, weakness, fatigue, SOB for 3 days prior to admission. Pt went to urgent care and was sent to [address] [place] [address].[facility] before admission and was sent home with inhalers and   Pt has h/o asthma. 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 In the ER labs revealed WBC - 11.7 and CT chest showed RLL pneumonia. Pt did not have fever. Was felt to have CAP. There was low suspicion for COVID as pt did not have fever. Sttarted on IV Ceftaz and azithromycin. Was sent home on [employer] and [address] Discharged on 4/7/2020. Pt feels okay today. Throat gets dry. Pt is [age]. Pt says breathing is better, not completely normal.  Pt does have phlegm but is clear. Pt does feel tired. Pt is requesting fluconazole for yeast infection. Pts blood pressure was elevated in the hospital and she was placed on HTN meds. Pos FH for HTN, mom and Dad.       ROS        Objective:   Vital Signs: (As obtained by patient/caregiver at home)  Visit Vitals  LMP 03/22/2020        [INSTRUCTIONS:  \"[x]\" Indicates a positive item  \"[]\" Indicates a negative item  -- DELETE ALL ITEMS NOT EXAMINED]    Constitutional: [x] Appears well-developed and well-nourished [x] No apparent distress      [] Abnormal -     Mental status: [x] Alert and awake  [x] Oriented to person/place/time [x] Able to follow commands    [] Abnormal -     Eyes:   EOM    [x]  Normal    [] Abnormal -   Sclera  [x]  Normal    [] Abnormal -          Discharge [x]  None visible   [] Abnormal -     HENT: [x] Normocephalic, atraumatic  [] Abnormal -   [x] Mouth/Throat: Mucous membranes are moist    External Ears [x] Normal  [] Abnormal -    Neck: [x] No visualized mass [] Abnormal -     Pulmonary/Chest: [x] Respiratory effort normal   [x] No visualized signs of difficulty breathing or respiratory distress        [] Abnormal -      Musculoskeletal:   [x] Normal gait with no signs of ataxia         [x] Normal range of motion of neck        [] Abnormal -     Neurological:        [x] No Facial Asymmetry (Cranial nerve 7 motor function) (limited exam due to video visit)          [x] No gaze palsy        [] Abnormal -          Skin:        [x] No significant exanthematous lesions or discoloration noted on facial skin         [] Abnormal -            Psychiatric:       [x] Normal Affect [] Abnormal -        [x] No Hallucinations    Other pertinent observable physical exam findings:-        We discussed the expected course, resolution and complications of the diagnosis(es) in detail. Medication risks, benefits, costs, interactions, and alternatives were discussed as indicated. I advised her to contact the office if her condition worsens, changes or fails to improve as anticipated. She expressed understanding with the diagnosis(es) and plan. Hellen Masters is a 29 y.o. female being evaluated by a video visit encounter for concerns as above. A caregiver was present when appropriate. Due to this being a TeleHealth encounter (During IRL- public health emergency), evaluation of the following organ systems was limited: Vitals/Constitutional/EENT/Resp/CV/GI//MS/Neuro/Skin/Heme-Lymph-Imm.   Pursuant to the emergency declaration under the [address] [place] [address], [address] [place] [address] authority and the [employer] and [employer] Act, this Virtual  Visit was conducted, with patient's (and/or legal guardian's) consent, to reduce the patient's risk of exposure to COVID-19 and provide necessary medical care. Services were provided through a video synchronous discussion virtually to substitute for in-person clinic visit. Patient and provider were located at their individual homes.         Oneil Garza MD

## 2020-04-10 NOTE — TELEPHONE ENCOUNTER
If Covid report is not ready, kindly call the Lab around 4.00 pm today again to check on status of COVID report . YOu can let pt know either way if it is positive or negative or still pending before the end of the day. I have asked the pt to call back on MOnday for the status of the results. Pt should self quarantine herself till COVID result is back. Just a reminder to call lab.

## 2020-04-11 LAB
BACTERIA SPEC CULT: NORMAL
SERVICE CMNT-IMP: NORMAL

## 2020-04-13 NOTE — TELEPHONE ENCOUNTER
Patient would like to have letter sent to her home. Per patient, her mother has already gone back to work today.

## 2020-04-13 NOTE — TELEPHONE ENCOUNTER
Did  [facility] lab inform us that COVID 19 test was negative for the pt.

## 2020-04-18 NOTE — DISCHARGE SUMMARY
Physician Discharge Summary     Patient ID:  Deepti Puente  [id number]  29 y.o.  1985    Admit date: 4/5/2020    Discharge date and time: 4/7/2020     Admission Diagnoses: Community acquired pneumonia [J18.9]    Discharge Diagnoses/Hospital Course   30 yo hx of asthma, morbid obesity, presented w/ cough, weakness, pneumonia     1) Cough/Community acquired pneumonia: no fevers, HA.  Low suspicion for COVID-19, but test sent on admission and noted to be negative after discharge. Pt was started on Cef/azithro for presumed CAP and steroids due to diffuse wheezing with albuterol. Her viral resp panel was negative. She was discharged with PO antibiotics, PO steroids and encouraged to use her nebulizer after discharge.      2) Asthma: with exacerbation. See above    3) Uncontrolled HTN: no prior hx of HTN. BP markedly elevated. Improved with amlodipine but may need uptitration     4) Morbid obesity: encouraged weight loss      PCP: Veronica Lawson MD     Consults: none     Discharge Exam:  Visit Vitals  /80 (BP 1 Location: Left arm, BP Patient Position: At rest)   Pulse 84   Temp 99.1 °F (37.3 °C)   Resp 20   Ht 5' 4\" (1.626 m)   Wt (!) 184.7 kg (407 lb 1.6 oz)   SpO2 92%   Breastfeeding No   BMI 69.88 kg/m²     Gen:  Well-developed, well-nourished, in no acute distress  HEENT:  Pink conjunctivae, PERRL, hearing intact to voice, moist mucous membranes  Neck:  Supple, without masses, thyroid non-tender  Resp: Improved expiratory wheezing   Card: HR 80's.  No murmurs, normal S1, S2 without thrills, bruits or peripheral edema  Abd:  Soft, non-tender, non-distended, normoactive bowel sounds are present  Musc:  No cyanosis or clubbing  Skin:  No rashes or ulcers, skin turgor is good  Neuro:  Cranial nerves 3-12 are grossly intact,  strength is 5/5 bilaterally and dorsi / plantarflexion is 5/5 bilaterally, follows commands appropriately  Psych:  Good insight, oriented to person, place and time, alert  Morbidly obese     Disposition: home     Patient Instructions:   Discharge Medication List as of 4/7/2020  2:40 PM      START taking these medications    Details   cefdinir (OMNICEF) 300 mg capsule Take 1 Cap by mouth two (2) times a day for 4 days. , Print, Disp-8 Cap, R-0      azithromycin (ZITHROMAX) 250 mg tablet Take 2 Tabs by mouth daily for 4 days. , Print, Disp-8 Tab, R-0      amLODIPine (NORVASC) 5 mg tablet Take 1 Tab by mouth daily. , Print, Disp-30 Tab, R-0      predniSONE (DELTASONE) 20 mg tablet Please take 40mg x 2 days then 20mg x 2 days then 10mg x 2 days then stop, Print, Disp-7 Tab, R-0         CONTINUE these medications which have NOT CHANGED    Details   albuterol (PROVENTIL VENTOLIN) 2.5 mg /3 mL (0.083 %) nebu 2.5 mg by Nebulization route every four (4) hours as needed for Wheezing., Historical Med      albuterol (PROVENTIL HFA, VENTOLIN HFA, PROAIR HFA) 90 mcg/actuation inhaler Take 2 Puffs by inhalation every four (4) hours as needed for Wheezing., Historical Med      acetaminophen (TYLENOL) 325 mg tablet Take 650 mg by mouth every six (6) hours as needed for Pain., Historical Med      fexofenadine (ALLEGRA) 180 mg tablet Take 180 mg by mouth daily. , Historical Med           Activity: as tolerated  Diet: resume prior diet     Follow-up with Ciarra Alvares MD   Follow-up Information     Follow up With Specialties Details Why Pam Smith MD Internal Medicine Go on 4/10/2020 Hospital follow-up scheduled at 9:00am ( If you have questions or need to reschedule please call ManjinderBoylemary ann  [facility] [address] [address] [phone]          Approximate time spent in patient care on day of discharge: 35 minutes     Signed:  Ronnie Brown MD  4/17/2020  8:48 PM

## 2020-04-22 ENCOUNTER — OFFICE VISIT (OUTPATIENT)
Dept: PRIMARY CARE CLINIC | Age: 35
End: 2020-04-22

## 2020-04-22 ENCOUNTER — TELEPHONE (OUTPATIENT)
Dept: PRIMARY CARE CLINIC | Age: 35
End: 2020-04-22

## 2020-04-22 DIAGNOSIS — J18.9 COMMUNITY ACQUIRED PNEUMONIA, UNSPECIFIED LATERALITY: Primary | ICD-10-CM

## 2020-04-22 RX ORDER — DOXYCYCLINE 100 MG/1
100 TABLET ORAL 2 TIMES DAILY
Qty: 14 TAB | Refills: 0 | Status: SHIPPED | OUTPATIENT
Start: 2020-04-22 | End: 2020-04-29

## 2020-04-22 NOTE — TELEPHONE ENCOUNTER
----- Message from Samantha Ibarra sent at 4/22/2020  7:39 AM EDT -----  Regarding: [facility]  Level 1/Escalated Issue      Caller's first and last name and relationship (if not the patient): Patient       Best contact number(s): [phone]      What are the symptoms: shortness of breath and wheezing       Transfer successful - yes/no (include outcome): yes      Transfer declined - yes/no (include reason): no      Was caller advised to seek appropriate level of care - yes/no: n/a      Details to clarify the request: Patient  was seen at the Emergency room Clinic and was admitted for two day for pneumonia. Pt stated that she was put on antibiotics and  still experiencing shortness of breath and wheezing. Pt would wanted to have refills on medications.  Pt was transferred to answering services for on call due to symptoms          Samantha Ibarra

## 2020-04-22 NOTE — PROGRESS NOTES
Patient was seen at [facility]  Patient verbally consented to receiving care (with any associated testing) and billing insurance  Please see scanned note for visit documentation    Krystal Hannah MD

## 2020-04-22 NOTE — TELEPHONE ENCOUNTER
Eugene the post hospital televisit pt was feeling fine. If these are new symptoms she may need to go to the red clinic and be re assessed. I tw ill be difficult on televisit.

## 2020-04-22 NOTE — TELEPHONE ENCOUNTER
Patient has been advised per  to be seen at the [facility] for further evaluation. Red clinic address given to patient was for the [facility] office location .

## 2020-04-22 NOTE — TELEPHONE ENCOUNTER
Patient was seen in [facility] ER on 04/05/2020 and has dx of pneumonia. Patient still with cough, SOB, and wheezing.  Please advise

## 2020-04-23 ENCOUNTER — HOSPITAL ENCOUNTER (OUTPATIENT)
Dept: GENERAL RADIOLOGY | Age: 35
Discharge: HOME | End: 2020-04-23
Payer: MEDICAID

## 2020-04-23 ENCOUNTER — TELEPHONE (OUTPATIENT)
Dept: FAMILY MEDICINE CLINIC | Age: 35
End: 2020-04-23

## 2020-04-23 DIAGNOSIS — J18.9 COMMUNITY ACQUIRED PNEUMONIA, UNSPECIFIED LATERALITY: ICD-10-CM

## 2020-04-23 DIAGNOSIS — R06.2 WHEEZING: Primary | ICD-10-CM

## 2020-04-23 LAB — SARS-COV-2, NAA: NOT DETECTED

## 2020-04-23 PROCEDURE — 71046 X-RAY EXAM CHEST 2 VIEWS: CPT

## 2020-04-23 RX ORDER — PREDNISONE 10 MG/1
TABLET ORAL
Qty: 21 TAB | Refills: 0 | Status: SHIPPED | OUTPATIENT
Start: 2020-04-23 | End: 2020-10-29

## 2020-04-23 NOTE — TELEPHONE ENCOUNTER
Please let patient know that I have sent prednisone to her pharmacy. Please also let her know to obtain the chest xray that was ordered yesterday.      Kwaku Fleming MD no

## 2020-04-23 NOTE — TELEPHONE ENCOUNTER
----- Message from Nicky Hatfield MD sent at 4/23/2020  9:02 AM EDT -----  Please call patient to let her know that her testing for coronavirus is negative  Went over above results with pt. Pt would like prednisone since the test was negative. She is having to use her albuterol every 4 hours. Told her I would check with Dr Clementine Torres and I will call her back if any problem. I confirmed the [facility] pharmacy. I also recommended that she contact her PCP about setting up a follow appt. Pt said that she will give them a call.

## 2020-10-29 ENCOUNTER — HOSPITAL ENCOUNTER (EMERGENCY)
Age: 35
Discharge: HOME | End: 2020-10-29
Attending: EMERGENCY MEDICINE
Payer: MEDICAID

## 2020-10-29 ENCOUNTER — APPOINTMENT (OUTPATIENT)
Dept: GENERAL RADIOLOGY | Age: 35
End: 2020-10-29
Attending: EMERGENCY MEDICINE
Payer: MEDICAID

## 2020-10-29 VITALS
OXYGEN SATURATION: 97 % | HEART RATE: 75 BPM | DIASTOLIC BLOOD PRESSURE: 81 MMHG | HEIGHT: 64 IN | WEIGHT: 293 LBS | BODY MASS INDEX: 50.02 KG/M2 | RESPIRATION RATE: 17 BRPM | SYSTOLIC BLOOD PRESSURE: 147 MMHG | TEMPERATURE: 97.4 F

## 2020-10-29 DIAGNOSIS — R06.2 WHEEZING: ICD-10-CM

## 2020-10-29 DIAGNOSIS — R05.9 COUGH: Primary | ICD-10-CM

## 2020-10-29 LAB
GLUCOSE BLD STRIP.AUTO-MCNC: 129 MG/DL (ref 65–100)
SERVICE CMNT-IMP: ABNORMAL

## 2020-10-29 PROCEDURE — 94640 AIRWAY INHALATION TREATMENT: CPT

## 2020-10-29 PROCEDURE — 99284 EMERGENCY DEPT VISIT MOD MDM: CPT

## 2020-10-29 PROCEDURE — 82962 GLUCOSE BLOOD TEST: CPT

## 2020-10-29 PROCEDURE — 87635 SARS-COV-2 COVID-19 AMP PRB: CPT

## 2020-10-29 PROCEDURE — 74011636637 HC RX REV CODE- 636/637: Performed by: EMERGENCY MEDICINE

## 2020-10-29 PROCEDURE — 71045 X-RAY EXAM CHEST 1 VIEW: CPT

## 2020-10-29 PROCEDURE — 74011250637 HC RX REV CODE- 250/637: Performed by: EMERGENCY MEDICINE

## 2020-10-29 RX ORDER — PREDNISONE 10 MG/1
TABLET ORAL
Qty: 21 TAB | Refills: 0 | OUTPATIENT
Start: 2020-10-29 | End: 2021-05-25

## 2020-10-29 RX ORDER — ALBUTEROL SULFATE 90 UG/1
2 AEROSOL, METERED RESPIRATORY (INHALATION)
Status: COMPLETED | OUTPATIENT
Start: 2020-10-29 | End: 2020-10-29

## 2020-10-29 RX ORDER — PREDNISONE 20 MG/1
60 TABLET ORAL
Status: COMPLETED | OUTPATIENT
Start: 2020-10-29 | End: 2020-10-29

## 2020-10-29 RX ORDER — ALBUTEROL SULFATE 0.83 MG/ML
2.5 SOLUTION RESPIRATORY (INHALATION)
Qty: 30 NEBULE | Refills: 0 | Status: SHIPPED | OUTPATIENT
Start: 2020-10-29 | End: 2021-07-05 | Stop reason: SDUPTHER

## 2020-10-29 RX ADMIN — PREDNISONE 60 MG: 20 TABLET ORAL at 22:18

## 2020-10-29 RX ADMIN — ALBUTEROL SULFATE 2 PUFF: 108 INHALANT RESPIRATORY (INHALATION) at 22:18

## 2020-10-29 RX ADMIN — ALBUTEROL SULFATE 2 PUFF: 108 INHALANT RESPIRATORY (INHALATION) at 19:30

## 2020-10-29 NOTE — ED TRIAGE NOTES
\"I think my smoking and the weather has brought on this cough. I have had a light cough for 2 days. The last time I had this I took an antibiotic by mouth. \" Has not called her doctor for this illness. Ambulated with a steady gait from the lobby to room 7 and did not appear to be in distress. Has been using her inhaler more recently too.

## 2020-10-29 NOTE — ED PROVIDER NOTES
Licha Alcocer is a 29 yo F with obesity who presents to the ED with cough and wheezing. She states that she has had cough for 2 days. Cough is productive of white sputum. She denies fevers or sick contacts. She does smoke.  She states she has had wheezing in the past.             Past Medical History:   Diagnosis Date    HPV (human papilloma virus) infection     Ill-defined condition     \"Bronchitis\"    Trichomonas        Past Surgical History:   Procedure Laterality Date    HX OTHER SURGICAL      vaginal wart removal by laser    HX POLYPECTOMY  01/2018    HX WISDOM TEETH EXTRACTION           Family History:   Problem Relation Age of Onset    Hypertension Mother     Hypertension Father     Diabetes Sister     Diabetes Paternal Grandmother        Social History     Socioeconomic History    Marital status: SINGLE     Spouse name: Not on file    Number of children: Not on file    Years of education: Not on file    Highest education level: Not on file   Occupational History    Not on file   Social Needs    Financial resource strain: Not on file    Food insecurity     Worry: Not on file     Inability: Not on file    Transportation needs     Medical: Not on file     Non-medical: Not on file   Tobacco Use    Smoking status: Current Every Day Smoker     Packs/day: 0.25    Smokeless tobacco: Never Used   Substance and Sexual Activity    Alcohol use: Yes     Comment: socially    Drug use: Not Currently    Sexual activity: Yes     Partners: Male     Birth control/protection: Condom   Lifestyle    Physical activity     Days per week: Not on file     Minutes per session: Not on file    Stress: Not on file   Relationships    Social connections     Talks on phone: Not on file     Gets together: Not on file     Attends Judaism service: Not on file     Active member of club or organization: Not on file     Attends meetings of clubs or organizations: Not on file     Relationship status: Not on file  Intimate partner violence     Fear of current or ex partner: Not on file     Emotionally abused: Not on file     Physically abused: Not on file     Forced sexual activity: Not on file   Other Topics Concern    Not on file   Social History Narrative    Not on file         ALLERGIES: Patient has no known allergies. Review of Systems   Constitutional: Negative for fever. HENT: Negative for sore throat. Eyes: Negative for visual disturbance. Respiratory: Positive for cough, shortness of breath and wheezing. Cardiovascular: Negative for chest pain. Gastrointestinal: Negative for abdominal pain. Genitourinary: Negative for dysuria. Musculoskeletal: Negative for back pain. Skin: Negative for rash. Neurological: Negative for headaches. Vitals:    10/29/20 1855   BP: (!) 141/74   Pulse: 93   Resp: 16   Temp: 97.4 °F (36.3 °C)   SpO2: 96%   Weight: (!) 186.3 kg (410 lb 11.5 oz)   Height: 5' 4\" (1.626 m)            Physical Exam  Vitals signs and nursing note reviewed. Constitutional:       General: She is not in acute distress. Appearance: She is well-developed. She is obese. HENT:      Head: Normocephalic and atraumatic. Eyes:      Conjunctiva/sclera: Conjunctivae normal.   Neck:      Musculoskeletal: Normal range of motion. Trachea: Phonation normal.   Cardiovascular:      Rate and Rhythm: Normal rate and regular rhythm. Pulmonary:      Effort: Pulmonary effort is normal. No respiratory distress. Breath sounds: Wheezing present. No rhonchi or rales. Abdominal:      General: There is no distension. Musculoskeletal: Normal range of motion. General: No tenderness. Skin:     General: Skin is warm and dry. Neurological:      Mental Status: She is alert. She is not disoriented. Motor: No abnormal muscle tone. MDM       10:02 PM  Patient reassessed and continues to have wheezing after 1st albuterol MDI. CXR normal.  accu check 129.   Will give prednisone and repeat albuterol. 10:55 PM  Patient reassessed and wheezing resolved. Will discharge home with prednisone dose pack and refill of albuterol neb solution after COVID swab obtained.    Procedures

## 2020-10-30 LAB
COVID-19, XGCOVT: NOT DETECTED
HEALTH STATUS, XMCV2T: NORMAL
SOURCE, COVRS: NORMAL
SPECIMEN SOURCE, FCOV2M: NORMAL
SPECIMEN TYPE, XMCV1T: NORMAL

## 2020-10-30 NOTE — ED NOTES
Patient ambulated to the bathroom with a steady gait to void. Patient resting on the stretcher. Call bell within reach; will continue to monitor.

## 2020-10-30 NOTE — ED NOTES
The patient was discharged home by Dr Mirna Jarquin in stable condition. The patient is alert and oriented, in no respiratory distress. The patient's diagnosis, condition and treatment were explained. The patient expressed understanding. Prescriptions given. A discharge plan has been developed. A  was not involved in the process. Aftercare instructions were given. Pt ambulatory out of the ED. Patient tolerated all medications well. COVID swab obtained and sent to the lab.

## 2021-05-25 ENCOUNTER — HOSPITAL ENCOUNTER (EMERGENCY)
Age: 36
Discharge: HOME | End: 2021-05-25
Attending: STUDENT IN AN ORGANIZED HEALTH CARE EDUCATION/TRAINING PROGRAM
Payer: COMMERCIAL

## 2021-05-25 VITALS
HEART RATE: 99 BPM | SYSTOLIC BLOOD PRESSURE: 162 MMHG | WEIGHT: 293 LBS | OXYGEN SATURATION: 97 % | DIASTOLIC BLOOD PRESSURE: 99 MMHG | HEIGHT: 64 IN | BODY MASS INDEX: 50.02 KG/M2 | TEMPERATURE: 99.7 F | RESPIRATION RATE: 28 BRPM

## 2021-05-25 DIAGNOSIS — J45.901 MILD ASTHMA WITH ACUTE EXACERBATION, UNSPECIFIED WHETHER PERSISTENT: Primary | ICD-10-CM

## 2021-05-25 PROCEDURE — 99284 EMERGENCY DEPT VISIT MOD MDM: CPT

## 2021-05-25 RX ORDER — PREDNISONE 20 MG/1
60 TABLET ORAL DAILY
Qty: 15 TABLET | Refills: 0 | Status: SHIPPED | OUTPATIENT
Start: 2021-05-25 | End: 2021-05-30

## 2021-05-25 RX ORDER — BENZONATATE 100 MG/1
200 CAPSULE ORAL
Qty: 30 CAPSULE | Refills: 0 | Status: SHIPPED | OUTPATIENT
Start: 2021-05-25 | End: 2021-06-01

## 2021-05-25 NOTE — ED PROVIDER NOTES
Shortness of Breath  This is a new problem. The average episode lasts 2 days. The problem occurs intermittently. The current episode started yesterday. The problem has not changed since onset. Associated symptoms include cough and wheezing. Pertinent negatives include no fever, no headaches, no chest pain, no syncope, no vomiting, no abdominal pain, no rash and no leg pain. The problem's precipitants include smoke and weather/humidity. She has tried beta-agonist inhalers for the symptoms. The treatment provided moderate relief. She has had prior ED visits.         Past Medical History:   Diagnosis Date    HPV (human papilloma virus) infection     Ill-defined condition     \"Bronchitis\"    Trichomonas        Past Surgical History:   Procedure Laterality Date    HX OTHER SURGICAL      vaginal wart removal by laser    HX POLYPECTOMY  01/2018    HX WISDOM TEETH EXTRACTION           Family History:   Problem Relation Age of Onset    Hypertension Mother     Hypertension Father     Diabetes Sister     Diabetes Paternal Grandmother        Social History     Socioeconomic History    Marital status: SINGLE     Spouse name: Not on file    Number of children: Not on file    Years of education: Not on file    Highest education level: Not on file   Occupational History    Not on file   Tobacco Use    Smoking status: Current Every Day Smoker     Packs/day: 0.25    Smokeless tobacco: Never Used   Substance and Sexual Activity    Alcohol use: Yes     Comment: socially    Drug use: Not Currently    Sexual activity: Yes     Partners: Male     Birth control/protection: Condom   Other Topics Concern    Not on file   Social History Narrative    Not on file     Social Determinants of Health     Financial Resource Strain:     Difficulty of Paying Living Expenses:    Food Insecurity:     Worried About Running Out of Food in the Last Year:     [address] in the Last Year:    Transportation Needs:     Lack of Transportation (Medical):  Lack of Transportation (Non-Medical):    Physical Activity:     Days of Exercise per Week:     Minutes of Exercise per Session:    Stress:     Feeling of Stress :    Social Connections:     Frequency of Communication with Friends and Family:     Frequency of Social Gatherings with Friends and Family:     Attends Restoration Services:     Active Member of Clubs or Organizations:     Attends Club or Organization Meetings:     Marital Status:    Intimate Partner Violence:     Fear of Current or Ex-Partner:     Emotionally Abused:     Physically Abused:     Sexually Abused: ALLERGIES: Patient has no known allergies. Review of Systems   Constitutional: Negative for fever. Respiratory: Positive for cough, shortness of breath and wheezing. Cardiovascular: Negative for chest pain and syncope. Gastrointestinal: Negative for abdominal pain and vomiting. Musculoskeletal: Negative for back pain. Skin: Negative for rash. Neurological: Negative for headaches. All other systems reviewed and are negative. Vitals:    05/25/21 1558 05/25/21 1605   BP: (!) 171/80    Pulse: 99    Resp: 28    Temp: 99.7 °F (37.6 °C)    SpO2: 97% 97%   Weight: (!) 189 kg (416 lb 10.7 oz)    Height: 5' 4\" (1.626 m)             Physical Exam  Vitals and nursing note reviewed. Constitutional:       General: She is not in acute distress. Appearance: She is well-developed. She is obese. HENT:      Head: Normocephalic and atraumatic. Eyes:      Conjunctiva/sclera: Conjunctivae normal.   Cardiovascular:      Rate and Rhythm: Normal rate and regular rhythm. Heart sounds: Normal heart sounds. Pulmonary:      Effort: Pulmonary effort is normal. No respiratory distress. Breath sounds: Wheezing (faint, b/l in lower lobes) present. Abdominal:      Palpations: Abdomen is soft. Tenderness: There is no abdominal tenderness. There is no guarding.    Musculoskeletal: General: No swelling, tenderness or deformity. Cervical back: Normal range of motion and neck supple. Skin:     General: Skin is warm and dry. Neurological:      Mental Status: She is alert and oriented to person, place, and time. Motor: No abnormal muscle tone.           MDM       Procedures

## 2021-05-25 NOTE — ED TRIAGE NOTES
Pt ambulated in the room, with c/o SOB,wheezing, coughing. Pt tried to relieve the wheezing with a inhaler X's 2 today, without relief. Syptoms have gotten worse in the past week, normally she is fine.  No covid vaccinations yet

## 2021-05-26 ENCOUNTER — VIRTUAL VISIT (OUTPATIENT)
Dept: PRIMARY CARE CLINIC | Age: 36
End: 2021-05-26
Payer: COMMERCIAL

## 2021-05-26 DIAGNOSIS — R03.0 ELEVATED BLOOD PRESSURE READING: ICD-10-CM

## 2021-05-26 DIAGNOSIS — R73.03 PREDIABETES: ICD-10-CM

## 2021-05-26 DIAGNOSIS — R05.9 COUGH: Primary | ICD-10-CM

## 2021-05-26 PROCEDURE — 99213 OFFICE O/P EST LOW 20 MIN: CPT | Performed by: INTERNAL MEDICINE

## 2021-05-26 RX ORDER — AZITHROMYCIN 250 MG/1
TABLET, FILM COATED ORAL
Qty: 6 TABLET | Refills: 0 | Status: SHIPPED | OUTPATIENT
Start: 2021-05-26 | End: 2021-05-31

## 2021-05-26 NOTE — PROGRESS NOTES
Penelope Mayers is a 28 y.o. female who was seen by synchronous (real-time) audio-video technology on 5/26/2021 for Cough        Assessment & Plan:   Diagnoses and all orders for this visit:    1. Cough  -     azithromycin (ZITHROMAX) 250 mg tablet; Take 2 tablets today, then take 1 tablet daily    2. Elevated blood pressure reading    3. Prediabetes    given past h/o elevated blood pressure reading and prediabetes - asked pt to make office appt in few months. I spent at least 20 minutes on this visit with this established patient. Subjective:       Prior to Admission medications    Medication Sig Start Date End Date Taking? Authorizing Provider   predniSONE (DELTASONE) 20 mg tablet Take 60 mg by mouth daily for 5 days. With Breakfast 5/25/21 5/30/21  Baldo Mendosa MD   benzonatate (Tessalon Perles) 100 mg capsule Take 2 Capsules by mouth three (3) times daily as needed for Cough for up to 7 days. 5/25/21 6/1/21  Baldo Mendosa MD   albuterol (PROVENTIL VENTOLIN) 2.5 mg /3 mL (0.083 %) nebu 3 mL by Nebulization route every four (4) hours as needed for Wheezing. 10/29/20   Jazmyne Ortiz MD   albuterol (PROVENTIL HFA, VENTOLIN HFA, PROAIR HFA) 90 mcg/actuation inhaler Take 2 Puffs by inhalation every four (4) hours as needed for Wheezing. Provider, Historical   acetaminophen (TYLENOL) 325 mg tablet Take 650 mg by mouth every six (6) hours as needed for Pain. Provider, Historical   fexofenadine (ALLEGRA) 180 mg tablet Take 180 mg by mouth daily. Provider, Historical     Pt is having wheezing and SOB. Went to ER. Pt is not having any cough. Pt started having mild cough a week ago. Sunday she ate a steak and felt like it got caught up in her throat and cough got worse. Past 2 days cough got worse and SOB. Pt is not sure if she has asthma. Pt says last year she had pneumonia twice. NO children at home. Pt is self employed. HAd COVID infection in March  NOt had COVID vaccine.   Pt does hair.  Pt was surprised that CXR was not done in ER. She reprots h/o pneumonia last year. Blood pressure elevated in the ER. ROS    Objective:   No flowsheet data found. [INSTRUCTIONS:  \"[x]\" Indicates a positive item  \"[]\" Indicates a negative item  -- DELETE ALL ITEMS NOT EXAMINED]    Constitutional: [x] Appears well-developed and well-nourished [x] No apparent distress      [] Abnormal -     Mental status: [x] Alert and awake  [x] Oriented to person/place/time [x] Able to follow commands    [] Abnormal -     Eyes:   EOM    [x]  Normal    [] Abnormal -   Sclera  [x]  Normal    [] Abnormal -          Discharge [x]  None visible   [] Abnormal -     HENT: [x] Normocephalic, atraumatic  [] Abnormal -   [x] Mouth/Throat: Mucous membranes are moist    External Ears [x] Normal  [] Abnormal -    Neck: [x] No visualized mass [] Abnormal -     Pulmonary/Chest: [x] Respiratory effort normal   [x] No visualized signs of difficulty breathing or respiratory distress        [] Abnormal -      Musculoskeletal:   [x] Normal gait with no signs of ataxia         [x] Normal range of motion of neck        [] Abnormal -     Neurological:        [x] No Facial Asymmetry (Cranial nerve 7 motor function) (limited exam due to video visit)          [x] No gaze palsy        [] Abnormal -          Skin:        [x] No significant exanthematous lesions or discoloration noted on facial skin         [] Abnormal -            Psychiatric:       [x] Normal Affect [] Abnormal -        [x] No Hallucinations    Other pertinent observable physical exam findings:-        We discussed the expected course, resolution and complications of the diagnosis(es) in detail. Medication risks, benefits, costs, interactions, and alternatives were discussed as indicated. I advised her to contact the office if her condition worsens, changes or fails to improve as anticipated. She expressed understanding with the diagnosis(es) and plan.        Philip Choudhary Mercedes Armstrong, was evaluated through a synchronous (real-time) audio-video encounter. The patient (or guardian if applicable) is aware that this is a billable service. Verbal consent to proceed has been obtained within the past 12 months. The visit was conducted pursuant to the emergency declaration under the [address] [place] [address] and the [place] [employer] [place] [employer] Act. Patient identification was verified, and a caregiver was present when appropriate. The patient was located in a state where the provider was credentialed to provide care.       Loli Hogue MD

## 2021-06-07 DIAGNOSIS — N76.0 ACUTE VAGINITIS: Primary | ICD-10-CM

## 2021-06-07 RX ORDER — FLUCONAZOLE 150 MG/1
150 TABLET ORAL DAILY
Qty: 1 TABLET | Refills: 0 | Status: SHIPPED | OUTPATIENT
Start: 2021-06-07 | End: 2021-06-08

## 2021-07-01 ENCOUNTER — OFFICE VISIT (OUTPATIENT)
Dept: PRIMARY CARE CLINIC | Age: 36
End: 2021-07-01
Payer: COMMERCIAL

## 2021-07-01 VITALS
HEIGHT: 64 IN | HEART RATE: 73 BPM | DIASTOLIC BLOOD PRESSURE: 84 MMHG | TEMPERATURE: 97.9 F | SYSTOLIC BLOOD PRESSURE: 137 MMHG | BODY MASS INDEX: 50.02 KG/M2 | WEIGHT: 293 LBS | RESPIRATION RATE: 20 BRPM | OXYGEN SATURATION: 99 %

## 2021-07-01 DIAGNOSIS — R06.83 SNORING: ICD-10-CM

## 2021-07-01 DIAGNOSIS — J45.909 MODERATE ASTHMA, UNSPECIFIED WHETHER COMPLICATED, UNSPECIFIED WHETHER PERSISTENT: ICD-10-CM

## 2021-07-01 DIAGNOSIS — M79.89 LEG SWELLING: ICD-10-CM

## 2021-07-01 DIAGNOSIS — K21.9 GASTROESOPHAGEAL REFLUX DISEASE WITHOUT ESOPHAGITIS: ICD-10-CM

## 2021-07-01 DIAGNOSIS — R73.03 PREDIABETES: ICD-10-CM

## 2021-07-01 DIAGNOSIS — J34.3 NASAL TURBINATE HYPERTROPHY: Primary | ICD-10-CM

## 2021-07-01 DIAGNOSIS — D64.9 ANEMIA, UNSPECIFIED TYPE: ICD-10-CM

## 2021-07-01 DIAGNOSIS — R06.2 WHEEZING: ICD-10-CM

## 2021-07-01 LAB
ALBUMIN SERPL-MCNC: 3.5 G/DL (ref 3.5–5)
ALBUMIN/GLOB SERPL: 1.1 {RATIO} (ref 1.1–2.2)
ALP SERPL-CCNC: 85 U/L (ref 45–117)
ALT SERPL-CCNC: 22 U/L (ref 12–78)
ANION GAP SERPL CALC-SCNC: 2 MMOL/L (ref 5–15)
AST SERPL-CCNC: 10 U/L (ref 15–37)
BASOPHILS # BLD: 0 K/UL (ref 0–0.1)
BASOPHILS NFR BLD: 0 % (ref 0–1)
BILIRUB SERPL-MCNC: 0.5 MG/DL (ref 0.2–1)
BUN SERPL-MCNC: 9 MG/DL (ref 6–20)
BUN/CREAT SERPL: 15 (ref 12–20)
CALCIUM SERPL-MCNC: 8.6 MG/DL (ref 8.5–10.1)
CHLORIDE SERPL-SCNC: 107 MMOL/L (ref 97–108)
CO2 SERPL-SCNC: 31 MMOL/L (ref 21–32)
CREAT SERPL-MCNC: 0.62 MG/DL (ref 0.55–1.02)
DIFFERENTIAL METHOD BLD: ABNORMAL
EOSINOPHIL # BLD: 0.5 K/UL (ref 0–0.4)
EOSINOPHIL NFR BLD: 5 % (ref 0–7)
ERYTHROCYTE [DISTWIDTH] IN BLOOD BY AUTOMATED COUNT: 16.6 % (ref 11.5–14.5)
EST. AVERAGE GLUCOSE BLD GHB EST-MCNC: 137 MG/DL
GLOBULIN SER CALC-MCNC: 3.3 G/DL (ref 2–4)
GLUCOSE SERPL-MCNC: 130 MG/DL (ref 65–100)
HBA1C MFR BLD: 6.4 % (ref 4–5.6)
HCT VFR BLD AUTO: 35.7 % (ref 35–47)
HGB BLD-MCNC: 10.9 G/DL (ref 11.5–16)
IMM GRANULOCYTES # BLD AUTO: 0 K/UL (ref 0–0.04)
IMM GRANULOCYTES NFR BLD AUTO: 0 % (ref 0–0.5)
LYMPHOCYTES # BLD: 2.8 K/UL (ref 0.8–3.5)
LYMPHOCYTES NFR BLD: 26 % (ref 12–49)
MCH RBC QN AUTO: 24.4 PG (ref 26–34)
MCHC RBC AUTO-ENTMCNC: 30.5 G/DL (ref 30–36.5)
MCV RBC AUTO: 80 FL (ref 80–99)
MONOCYTES # BLD: 0.5 K/UL (ref 0–1)
MONOCYTES NFR BLD: 5 % (ref 5–13)
NEUTS SEG # BLD: 6.8 K/UL (ref 1.8–8)
NEUTS SEG NFR BLD: 64 % (ref 32–75)
NRBC # BLD: 0 K/UL (ref 0–0.01)
NRBC BLD-RTO: 0 PER 100 WBC
PLATELET # BLD AUTO: 354 K/UL (ref 150–400)
PMV BLD AUTO: 9.8 FL (ref 8.9–12.9)
POTASSIUM SERPL-SCNC: 4.6 MMOL/L (ref 3.5–5.1)
PROT SERPL-MCNC: 6.8 G/DL (ref 6.4–8.2)
RBC # BLD AUTO: 4.46 M/UL (ref 3.8–5.2)
SODIUM SERPL-SCNC: 140 MMOL/L (ref 136–145)
WBC # BLD AUTO: 10.7 K/UL (ref 3.6–11)

## 2021-07-01 PROCEDURE — 99214 OFFICE O/P EST MOD 30 MIN: CPT | Performed by: INTERNAL MEDICINE

## 2021-07-01 RX ORDER — FLUTICASONE PROPIONATE AND SALMETEROL 250; 50 UG/1; UG/1
1 POWDER RESPIRATORY (INHALATION) EVERY 12 HOURS
Qty: 1 INHALER | Refills: 3 | Status: SHIPPED | OUTPATIENT
Start: 2021-07-01

## 2021-07-01 RX ORDER — FLUTICASONE PROPIONATE 50 MCG
2 SPRAY, SUSPENSION (ML) NASAL DAILY
Qty: 1 BOTTLE | Refills: 3 | Status: SHIPPED | OUTPATIENT
Start: 2021-07-01

## 2021-07-01 RX ORDER — OMEPRAZOLE 40 MG/1
40 CAPSULE, DELAYED RELEASE ORAL DAILY
Qty: 30 CAPSULE | Refills: 3 | Status: SHIPPED | OUTPATIENT
Start: 2021-07-01

## 2021-07-01 NOTE — PROGRESS NOTES
Chief Complaint   Patient presents with    Wheezing        Visit Vitals  /84 (BP 1 Location: Right upper arm, BP Patient Position: Sitting)   Pulse 73   Temp 97.9 °F (36.6 °C) (Oral)   Resp 20   Ht 5' 4\" (1.626 m)   Wt (!) 415 lb 3.2 oz (188.3 kg)   SpO2 99%   BMI 71.27 kg/m²        1. Have you been to the ER, urgent care clinic since your last visit? Hospitalized since your last visit? Yes 2 weeks ago [facility] for wheezing    2. Have you seen or consulted any other health care providers outside of the [address] [place] [address] since your last visit? Include any pap smears or colon screening.  No

## 2021-07-01 NOTE — PROGRESS NOTES
Ramone Elaine is a 28 y.o.  female and presents with     Chief Complaint   Patient presents with    Wheezing    Asthma    Cough     Pt was seen at [facility] 2 weeks ago for wheezing and was given nebulizer treatment and prednisone. Pt has h/o asthma. Pt has GERD. Pt does not have nasal congestion. Pt does not have maintenance inhaler. She does have symptoms of acid reflux. She does snore. She has gained weight over the last year. Past Medical History:   Diagnosis Date    HPV (human papilloma virus) infection     Ill-defined condition     \"Bronchitis\"    Trichomonas      Past Surgical History:   Procedure Laterality Date    HX OTHER SURGICAL      vaginal wart removal by laser    HX POLYPECTOMY  01/2018    HX WISDOM TEETH EXTRACTION       Current Outpatient Medications   Medication Sig    fluticasone propionate (FLONASE) 50 mcg/actuation nasal spray 2 Sprays by Both Nostrils route daily.  fluticasone propion-salmeteroL (ADVAIR/WIXELA) 250-50 mcg/dose diskus inhaler Take 1 Puff by inhalation every twelve (12) hours.  omeprazole (PRILOSEC) 40 mg capsule Take 1 Capsule by mouth daily.  albuterol (PROVENTIL VENTOLIN) 2.5 mg /3 mL (0.083 %) nebu 3 mL by Nebulization route every four (4) hours as needed for Wheezing.  albuterol (PROVENTIL HFA, VENTOLIN HFA, PROAIR HFA) 90 mcg/actuation inhaler Take 2 Puffs by inhalation every four (4) hours as needed for Wheezing.  acetaminophen (TYLENOL) 325 mg tablet Take 650 mg by mouth every six (6) hours as needed for Pain.  fexofenadine (ALLEGRA) 180 mg tablet Take 180 mg by mouth daily. No current facility-administered medications for this visit.      Health Maintenance   Topic Date Due    Hepatitis C Screening  Never done    Pneumococcal 0-64 years (1 of 2 - PPSV23) Never done    COVID-19 Vaccine (1) Never done    DTaP/Tdap/Td series (1 - Tdap) Never done    PAP AKA CERVICAL CYTOLOGY  Never done    Flu Vaccine (1) 09/01/2021    A1C test (Diabetic or Prediabetic)  07/01/2022       There is no immunization history on file for this patient. No LMP recorded. Allergies and Intolerances:   No Known Allergies    Family History:   Family History   Problem Relation Age of Onset    Hypertension Mother     Hypertension Father     Diabetes Sister     Diabetes Paternal Grandmother        Social History:   She  reports that she has been smoking cigarettes. She started smoking about 17 years ago. She has a 4.25 pack-year smoking history. She has never used smokeless tobacco.  She  reports current alcohol use.             Review of Systems:   General: negative for - chills, fatigue, fever, weight change  Psych: negative for - anxiety, depression, irritability or mood swings  ENT: negative for - headaches, hearing change, nasal congestion, oral lesions, sneezing or sore throat  Heme/ Lymph: negative for - bleeding problems, bruising, pallor or swollen lymph nodes  Endo: negative for - hot flashes, polydipsia/polyuria or temperature intolerance  Resp: negative for - cough, shortness of breath or wheezing  CV: negative for - chest pain, edema or palpitations  GI: negative for - abdominal pain, change in bowel habits, constipation, diarrhea or nausea/vomiting  : negative for - dysuria, hematuria, incontinence, pelvic pain or vulvar/vaginal symptoms  MSK: negative for - joint pain, joint swelling or muscle pain  Neuro: negative for - confusion, headaches, seizures or weakness  Derm: negative for - dry skin, hair changes, rash or skin lesion changes          Physical:   Vitals:   Vitals:    07/01/21 0808   BP: 137/84   Pulse: 73   Resp: 20   Temp: 97.9 °F (36.6 °C)   TempSrc: Oral   SpO2: 99%   Weight: (!) 415 lb 3.2 oz (188.3 kg)   Height: 5' 4\" (1.626 m)           Exam:   HEENT- atraumatic,normocephalic, awake, oriented, well nourished, morbidly obese  Neck - supple,no enlarged lymph nodes, no JVD, no thyromegaly  Chest- CTA, no rhonchi, no crackles  Heart- rrr, no murmurs / gallop/rub  Abdomen- soft,BS+,NT, no hepatosplenomegaly  Ext -+1 +2 edema in both lower extremities  Neuro- no focal deficits. Power 5/5 all extremities  Skin - warm,dry, no obvious rashes. Review of Data:   LABS:   Lab Results   Component Value Date/Time    WBC 10.7 07/01/2021 08:40 AM    HGB 10.9 (L) 07/01/2021 08:40 AM    HCT 35.7 07/01/2021 08:40 AM    PLATELET 621 73/42/3299 08:40 AM     Lab Results   Component Value Date/Time    Sodium 140 07/01/2021 08:40 AM    Potassium 4.6 07/01/2021 08:40 AM    Chloride 107 07/01/2021 08:40 AM    CO2 31 07/01/2021 08:40 AM    Glucose 130 (H) 07/01/2021 08:40 AM    BUN 9 07/01/2021 08:40 AM    Creatinine 0.62 07/01/2021 08:40 AM     Lab Results   Component Value Date/Time    Cholesterol, total 137 07/02/2019 02:37 PM    HDL Cholesterol 39 (L) 07/02/2019 02:37 PM    LDL, calculated 86 07/02/2019 02:37 PM    Triglyceride 58 07/02/2019 02:37 PM     No components found for: GPT        Impression / Plan:        ICD-10-CM ICD-9-CM    1. Nasal turbinate hypertrophy  J34.3 478.0 fluticasone propionate (FLONASE) 50 mcg/actuation nasal spray      REFERRAL TO PULMONARY DISEASE   2. Moderate asthma, unspecified whether complicated, unspecified whether persistent  J45.909 493.90 fluticasone propion-salmeteroL (ADVAIR/WIXELA) 250-50 mcg/dose diskus inhaler      REFERRAL TO PULMONARY DISEASE      XR CHEST PA LAT   3. Gastroesophageal reflux disease without esophagitis  K21.9 530.81 omeprazole (PRILOSEC) 40 mg capsule   4. Snoring  R06.83 786.09 SLEEP MEDICINE REFERRAL   5. Leg swelling  M79.89 729.81 XR CHEST PA LAT      METABOLIC PANEL, COMPREHENSIVE      METABOLIC PANEL, COMPREHENSIVE   6. Wheezing  R06.2 786.07 XR CHEST PA LAT   7. Prediabetes  R73.03 790.29 HEMOGLOBIN A1C WITH EAG      HEMOGLOBIN A1C WITH EAG   8.  Anemia, unspecified type  D64.9 285.9 CBC WITH AUTOMATED DIFF      CBC WITH AUTOMATED DIFF         Explained to patient risk benefits of the medications. Advised patient to stop meds if having any side effects. Pt verbalized understanding of the instructions. I have discussed the diagnosis with the patient and the intended plan as seen in the above orders. The patient has received an after-visit summary and questions were answered concerning future plans. I have discussed medication side effects and warnings with the patient as well. I have reviewed the plan of care with the patient, accepted their input and they are in agreement with the treatment goals. Reviewed plan of care. Patient has provided input and agrees with goals. Follow-up and Dispositions    · Return in about 3 months (around 10/1/2021).          Amanda Anthony MD

## 2021-07-02 RX ORDER — FLUTICASONE PROPIONATE AND SALMETEROL 250; 50 UG/1; UG/1
1 POWDER RESPIRATORY (INHALATION) EVERY 12 HOURS
Qty: 1 INHALER | Refills: 3 | OUTPATIENT
Start: 2021-07-02

## 2021-07-05 ENCOUNTER — HOSPITAL ENCOUNTER (EMERGENCY)
Age: 36
Discharge: HOME | End: 2021-07-05
Attending: EMERGENCY MEDICINE
Payer: COMMERCIAL

## 2021-07-05 ENCOUNTER — APPOINTMENT (OUTPATIENT)
Dept: GENERAL RADIOLOGY | Age: 36
End: 2021-07-05
Attending: EMERGENCY MEDICINE
Payer: COMMERCIAL

## 2021-07-05 VITALS
TEMPERATURE: 98.6 F | WEIGHT: 293 LBS | RESPIRATION RATE: 16 BRPM | BODY MASS INDEX: 50.02 KG/M2 | HEART RATE: 90 BPM | OXYGEN SATURATION: 99 % | DIASTOLIC BLOOD PRESSURE: 84 MMHG | SYSTOLIC BLOOD PRESSURE: 135 MMHG | HEIGHT: 64 IN

## 2021-07-05 DIAGNOSIS — J45.41 MODERATE PERSISTENT ASTHMA WITH ACUTE EXACERBATION: Primary | ICD-10-CM

## 2021-07-05 PROCEDURE — 77030013140 HC MSK NEB VYRM -A

## 2021-07-05 PROCEDURE — 71046 X-RAY EXAM CHEST 2 VIEWS: CPT

## 2021-07-05 PROCEDURE — 99283 EMERGENCY DEPT VISIT LOW MDM: CPT

## 2021-07-05 PROCEDURE — 94640 AIRWAY INHALATION TREATMENT: CPT

## 2021-07-05 PROCEDURE — 74011636637 HC RX REV CODE- 636/637: Performed by: EMERGENCY MEDICINE

## 2021-07-05 PROCEDURE — 74011000250 HC RX REV CODE- 250: Performed by: EMERGENCY MEDICINE

## 2021-07-05 PROCEDURE — 2709999900 HC NON-CHARGEABLE SUPPLY

## 2021-07-05 RX ORDER — PREDNISONE 20 MG/1
60 TABLET ORAL DAILY
Qty: 15 TABLET | Refills: 0 | Status: SHIPPED | OUTPATIENT
Start: 2021-07-05 | End: 2021-07-10

## 2021-07-05 RX ORDER — MONTELUKAST SODIUM 10 MG/1
10 TABLET ORAL DAILY
COMMUNITY

## 2021-07-05 RX ORDER — PREDNISONE 20 MG/1
60 TABLET ORAL
Status: COMPLETED | OUTPATIENT
Start: 2021-07-05 | End: 2021-07-05

## 2021-07-05 RX ORDER — ALBUTEROL SULFATE 0.83 MG/ML
2.5 SOLUTION RESPIRATORY (INHALATION)
Qty: 30 NEBULE | Refills: 0 | Status: SHIPPED | OUTPATIENT
Start: 2021-07-05

## 2021-07-05 RX ADMIN — ALBUTEROL SULFATE 1 DOSE: 2.5 SOLUTION RESPIRATORY (INHALATION) at 13:56

## 2021-07-05 RX ADMIN — PREDNISONE 60 MG: 20 TABLET ORAL at 13:55

## 2021-07-05 NOTE — ED PROVIDER NOTES
75-year-old female with a history of asthma presents with shortness of breath and wheezing for the past month and a half. She was supposed to be on a long-acting inhaler but her insurance denied it. She presents here today because her symptoms have persisted and not gotten any better. She has had cough that has been minimally productive. She denies any fevers or chills. She denies any leg swelling. She states that the wheezing is worse at night. It is not getting any better and she cannot get her medications because again her insurance denied it.       Wheezing          Past Medical History:   Diagnosis Date    Asthma     HPV (human papilloma virus) infection     Ill-defined condition     \"Bronchitis\"    Trichomonas        Past Surgical History:   Procedure Laterality Date    HX OTHER SURGICAL      vaginal wart removal by laser    HX POLYPECTOMY  01/2018    HX WISDOM TEETH EXTRACTION           Family History:   Problem Relation Age of Onset    Hypertension Mother     Hypertension Father     Diabetes Sister     Diabetes Paternal Grandmother        Social History     Socioeconomic History    Marital status: SINGLE     Spouse name: Not on file    Number of children: Not on file    Years of education: Not on file    Highest education level: Not on file   Occupational History    Not on file   Tobacco Use    Smoking status: Former Smoker     Packs/day: 0.25     Years: 17.00     Pack years: 4.25     Types: Cigarettes     Start date: 7/1/2004    Smokeless tobacco: Never Used   Vaping Use    Vaping Use: Never used   Substance and Sexual Activity    Alcohol use: Yes     Comment: socially    Drug use: Not Currently    Sexual activity: Yes     Partners: Male     Birth control/protection: Condom   Other Topics Concern    Not on file   Social History Narrative    Not on file     Social Determinants of Health     Financial Resource Strain:     Difficulty of Paying Living Expenses:    Food Insecurity:  Worried About [address] [facility] in the Last Year:    [address] in the Last Year:    Transportation Needs:     Lack of Transportation (Medical):  Lack of Transportation (Non-Medical):    Physical Activity:     Days of Exercise per Week:     Minutes of Exercise per Session:    Stress:     Feeling of Stress :    Social Connections:     Frequency of Communication with Friends and Family:     Frequency of Social Gatherings with Friends and Family:     Attends [employer] Services:     Active Member of Clubs or Organizations:     Attends Club or Organization Meetings:     Marital Status:    Intimate Partner Violence:     Fear of Current or Ex-Partner:     Emotionally Abused:     Physically Abused:     Sexually Abused: ALLERGIES: Patient has no known allergies. Review of Systems   Respiratory: Positive for wheezing. All other systems reviewed and are negative. Vitals:    07/05/21 1311   BP: (!) 157/84   Pulse: 77   Resp: 16   Temp: 98.6 °F (37 °C)   SpO2: 98%   Weight: (!) 188.4 kg (415 lb 5.6 oz)   Height: 5' 4\" (1.626 m)            Physical Exam  Vitals and nursing note reviewed. Constitutional:       General: She is not in acute distress. HENT:      Head: Normocephalic and atraumatic. Mouth/Throat:      Mouth: Mucous membranes are moist.   Eyes:      General: No scleral icterus. Conjunctiva/sclera: Conjunctivae normal.      Pupils: Pupils are equal, round, and reactive to light. Neck:      Trachea: No tracheal deviation. Cardiovascular:      Rate and Rhythm: Normal rate and regular rhythm. Pulmonary:      Effort: Prolonged expiration present. No respiratory distress. Breath sounds: Decreased air movement present. Wheezing present. No rales. Abdominal:      General: There is no distension. Palpations: Abdomen is soft. Tenderness: There is no abdominal tenderness.    Genitourinary:     Comments: deferred  Musculoskeletal:         General: No deformity. Cervical back: Neck supple. Skin:     General: Skin is warm and dry. Neurological:      General: No focal deficit present. Mental Status: She is alert. Psychiatric:         Mood and Affect: Mood normal.          MDM  Number of Diagnoses or Management Options  Moderate persistent asthma with acute exacerbation  Diagnosis management comments: 55-year-old female presents with wheezing. She was prescribed a controller medicine but her insurance denied it. She will likely need a prednisone rescue medication now. Her x-ray was unremarkable. She is not hypoxic. We will start on prednisone and have her follow-up with her primary care doctor and hopefully the insurance will improve something. SHe is given return precautions. She felt better after breathing treatment in the emergency department.          Procedures

## 2021-07-05 NOTE — DISCHARGE INSTRUCTIONS
Take prednisone 60 mg daily for the next 5 days. You may start tomorrow because you received a dose in the emergency department. Use albuterol as needed every 4 hours. Additional nebulizers were sent to your pharmacy. Follow-up with your primary care doctor to receive an alternative long-acting inhaler. Return to the emergency department for any new or worsening symptoms such as fever, worsening shortness of breath, leg swelling, any other concerns.

## 2021-07-05 NOTE — ED TRIAGE NOTES
Patient presents ambulatory to treatment area with a steady gait. Patient states she has had wheezing and cough for about one month. Patient states she saw PCP, who ordered a chest xray and a longer acting inhaler. Chest xray has not been completed yet and her insurance would not cover her inhaler. Expiratory wheezes noted bilaterally upon auscultation in triage.

## 2021-09-28 ENCOUNTER — TELEPHONE (OUTPATIENT)
Dept: PRIMARY CARE CLINIC | Age: 36
End: 2021-09-28

## 2021-09-28 NOTE — TELEPHONE ENCOUNTER
Pt would like an appt next week with Ofe. She needs something for toenail fungus. Nothing over the counter works.

## 2022-03-18 PROBLEM — J18.9 COMMUNITY ACQUIRED PNEUMONIA: Status: ACTIVE | Noted: 2020-04-05

## 2022-03-18 PROBLEM — E66.01 MORBID OBESITY (HCC): Status: ACTIVE | Noted: 2019-08-19

## 2022-03-19 PROBLEM — I10 HYPERTENSION: Status: ACTIVE | Noted: 2020-04-05

## 2023-07-31 ENCOUNTER — HOSPITAL ENCOUNTER (EMERGENCY)
Facility: HOSPITAL | Age: 38
Discharge: HOME | End: 2023-07-31
Attending: EMERGENCY MEDICINE
Payer: COMMERCIAL

## 2023-07-31 ENCOUNTER — APPOINTMENT (OUTPATIENT)
Facility: HOSPITAL | Age: 38
End: 2023-07-31
Payer: COMMERCIAL

## 2023-07-31 VITALS
HEIGHT: 64 IN | SYSTOLIC BLOOD PRESSURE: 136 MMHG | TEMPERATURE: 98.5 F | WEIGHT: 293 LBS | HEART RATE: 79 BPM | BODY MASS INDEX: 50.02 KG/M2 | RESPIRATION RATE: 18 BRPM | OXYGEN SATURATION: 99 % | DIASTOLIC BLOOD PRESSURE: 83 MMHG

## 2023-07-31 DIAGNOSIS — R00.2 PALPITATIONS: Primary | ICD-10-CM

## 2023-07-31 LAB
ALBUMIN SERPL-MCNC: 3.2 G/DL (ref 3.5–5)
ALBUMIN/GLOB SERPL: 0.8 (ref 1.1–2.2)
ALP SERPL-CCNC: 77 U/L (ref 45–117)
ALT SERPL-CCNC: 23 U/L (ref 12–78)
ANION GAP SERPL CALC-SCNC: 6 MMOL/L (ref 5–15)
AST SERPL-CCNC: 15 U/L (ref 15–37)
BASOPHILS # BLD: 0 K/UL (ref 0–0.1)
BASOPHILS NFR BLD: 0 % (ref 0–1)
BILIRUB SERPL-MCNC: 0.4 MG/DL (ref 0.2–1)
BUN SERPL-MCNC: 9 MG/DL (ref 6–20)
BUN/CREAT SERPL: 11 (ref 12–20)
CALCIUM SERPL-MCNC: 8.5 MG/DL (ref 8.5–10.1)
CHLORIDE SERPL-SCNC: 106 MMOL/L (ref 97–108)
CO2 SERPL-SCNC: 30 MMOL/L (ref 21–32)
CREAT SERPL-MCNC: 0.85 MG/DL (ref 0.55–1.02)
D DIMER PPP FEU-MCNC: 0.71 MG/L FEU (ref 0–0.65)
DIFFERENTIAL METHOD BLD: ABNORMAL
EOSINOPHIL # BLD: 0.3 K/UL (ref 0–0.4)
EOSINOPHIL NFR BLD: 3 % (ref 0–7)
ERYTHROCYTE [DISTWIDTH] IN BLOOD BY AUTOMATED COUNT: 16.2 % (ref 11.5–14.5)
GLOBULIN SER CALC-MCNC: 4.1 G/DL (ref 2–4)
GLUCOSE SERPL-MCNC: 108 MG/DL (ref 65–100)
HCT VFR BLD AUTO: 35 % (ref 35–47)
HGB BLD-MCNC: 10.6 G/DL (ref 11.5–16)
IMM GRANULOCYTES # BLD AUTO: 0 K/UL (ref 0–0.04)
IMM GRANULOCYTES NFR BLD AUTO: 0 % (ref 0–0.5)
LYMPHOCYTES # BLD: 2.5 K/UL (ref 0.8–3.5)
LYMPHOCYTES NFR BLD: 32 % (ref 12–49)
MCH RBC QN AUTO: 23 PG (ref 26–34)
MCHC RBC AUTO-ENTMCNC: 30.3 G/DL (ref 30–36.5)
MCV RBC AUTO: 76.1 FL (ref 80–99)
MONOCYTES # BLD: 0.5 K/UL (ref 0–1)
MONOCYTES NFR BLD: 6 % (ref 5–13)
NEUTS SEG # BLD: 4.6 K/UL (ref 1.8–8)
NEUTS SEG NFR BLD: 59 % (ref 32–75)
NRBC # BLD: 0 K/UL (ref 0–0.01)
NRBC BLD-RTO: 0 PER 100 WBC
PLATELET # BLD AUTO: 357 K/UL (ref 150–400)
PMV BLD AUTO: 8.8 FL (ref 8.9–12.9)
POTASSIUM SERPL-SCNC: 3.9 MMOL/L (ref 3.5–5.1)
PROT SERPL-MCNC: 7.3 G/DL (ref 6.4–8.2)
RBC # BLD AUTO: 4.6 M/UL (ref 3.8–5.2)
SODIUM SERPL-SCNC: 142 MMOL/L (ref 136–145)
T4 FREE SERPL-MCNC: 1.1 NG/DL (ref 0.8–1.5)
TROPONIN I SERPL HS-MCNC: 4 NG/L (ref 0–51)
TSH SERPL DL<=0.05 MIU/L-ACNC: 1.25 UIU/ML (ref 0.36–3.74)
WBC # BLD AUTO: 7.8 K/UL (ref 3.6–11)

## 2023-07-31 PROCEDURE — 94761 N-INVAS EAR/PLS OXIMETRY MLT: CPT

## 2023-07-31 PROCEDURE — 84439 ASSAY OF FREE THYROXINE: CPT

## 2023-07-31 PROCEDURE — 96360 HYDRATION IV INFUSION INIT: CPT

## 2023-07-31 PROCEDURE — 6360000004 HC RX CONTRAST MEDICATION: Performed by: EMERGENCY MEDICINE

## 2023-07-31 PROCEDURE — 36415 COLL VENOUS BLD VENIPUNCTURE: CPT

## 2023-07-31 PROCEDURE — 99285 EMERGENCY DEPT VISIT HI MDM: CPT

## 2023-07-31 PROCEDURE — 84443 ASSAY THYROID STIM HORMONE: CPT

## 2023-07-31 PROCEDURE — 2580000003 HC RX 258: Performed by: EMERGENCY MEDICINE

## 2023-07-31 PROCEDURE — 93005 ELECTROCARDIOGRAM TRACING: CPT | Performed by: EMERGENCY MEDICINE

## 2023-07-31 PROCEDURE — 85379 FIBRIN DEGRADATION QUANT: CPT

## 2023-07-31 PROCEDURE — 84484 ASSAY OF TROPONIN QUANT: CPT

## 2023-07-31 PROCEDURE — 85025 COMPLETE CBC W/AUTO DIFF WBC: CPT

## 2023-07-31 PROCEDURE — 96361 HYDRATE IV INFUSION ADD-ON: CPT

## 2023-07-31 PROCEDURE — 71275 CT ANGIOGRAPHY CHEST: CPT

## 2023-07-31 PROCEDURE — 80053 COMPREHEN METABOLIC PANEL: CPT

## 2023-07-31 PROCEDURE — 71046 X-RAY EXAM CHEST 2 VIEWS: CPT

## 2023-07-31 RX ORDER — 0.9 % SODIUM CHLORIDE 0.9 %
1000 INTRAVENOUS SOLUTION INTRAVENOUS ONCE
Status: COMPLETED | OUTPATIENT
Start: 2023-07-31 | End: 2023-07-31

## 2023-07-31 RX ORDER — ASPIRIN 81 MG/1
81 TABLET ORAL DAILY
COMMUNITY

## 2023-07-31 RX ADMIN — IOPAMIDOL 100 ML: 755 INJECTION, SOLUTION INTRAVENOUS at 18:01

## 2023-07-31 RX ADMIN — SODIUM CHLORIDE 1000 ML: 9 INJECTION, SOLUTION INTRAVENOUS at 17:24

## 2023-07-31 ASSESSMENT — ENCOUNTER SYMPTOMS
ABDOMINAL PAIN: 0
SINUS PRESSURE: 0
SHORTNESS OF BREATH: 0

## 2023-07-31 ASSESSMENT — LIFESTYLE VARIABLES: HOW OFTEN DO YOU HAVE A DRINK CONTAINING ALCOHOL: PATIENT DECLINED

## 2023-07-31 ASSESSMENT — PAIN - FUNCTIONAL ASSESSMENT: PAIN_FUNCTIONAL_ASSESSMENT: NONE - DENIES PAIN

## 2023-07-31 NOTE — ED TRIAGE NOTES
Patient presents ambulatory to treatment area with a steady gait. Patient states she feels \"fluttering\" in her chest that she also feels in her throat. States the sensation has been consistent for the past five days. Denies chest pain or shortness of breath. States she does not sleep well and does deal with an increased amount of stress.

## 2023-07-31 NOTE — ED NOTES
Pt discharged to home at this time. All discharge instructions provided to patient. All questions answered. IV removed. No distress noted at time of discharge.       Quoc Ocampo RN  07/31/23 1427

## 2023-07-31 NOTE — ED PROVIDER NOTES
[facility] EMERGENCY DEPT  EMERGENCY DEPARTMENT ENCOUNTER      Pt Name: Jovanny Avitia  MRN: [id number]  [address] [facility] 1985  Date of evaluation: 7/31/2023  Provider: Samantha Abrams       Chief Complaint   Patient presents with    Palpitations         HISTORY OF PRESENT ILLNESS   (Location/Symptom, Timing/Onset, Context/Setting, Quality, Duration, Modifying Factors, Severity)  Note limiting factors. 55-year-old female presents emergency department chief complaint of sudden onset of palpitations and upper chest discomfort several days ago. Patient states that she was at rest when this occurred. She states she has been having this discomfort intermittently for the last several days. Nothing seems to make it worse or better. Denies prior history of this. Denies history of DVT or PE. She reports a family history of coronary artery disease at a young age, she does smoke but denies drug use or excessive alcohol    The history is provided by the patient. Review of External Medical Records:     Nursing Notes were reviewed. REVIEW OF SYSTEMS    (2-9 systems for level 4, 10 or more for level 5)     Review of Systems   Constitutional:  Negative for activity change, appetite change and fatigue. HENT:  Negative for sinus pressure. Respiratory:  Negative for shortness of breath. Cardiovascular:  Positive for chest pain and palpitations. Gastrointestinal:  Negative for abdominal pain. Genitourinary:  Negative for difficulty urinating. Skin:  Negative for wound. Neurological:  Negative for weakness and light-headedness. All other systems reviewed and are negative. Except as noted above the remainder of the review of systems was reviewed and negative.        PAST MEDICAL HISTORY     Past Medical History:   Diagnosis Date    Asthma     HPV (human papilloma virus) infection     Ill-defined condition     \"Bronchitis\"    Trichomonas          SURGICAL HISTORY       Past Surgical

## 2023-08-01 LAB
EKG ATRIAL RATE: 82 BPM
EKG DIAGNOSIS: NORMAL
EKG P AXIS: 48 DEGREES
EKG P-R INTERVAL: 166 MS
EKG Q-T INTERVAL: 370 MS
EKG QRS DURATION: 76 MS
EKG QTC CALCULATION (BAZETT): 432 MS
EKG R AXIS: 41 DEGREES
EKG T AXIS: 33 DEGREES
EKG VENTRICULAR RATE: 82 BPM

## 2023-08-01 PROCEDURE — 93010 ELECTROCARDIOGRAM REPORT: CPT | Performed by: INTERNAL MEDICINE

## 2024-12-06 NOTE — PROGRESS NOTES
1915 Bedside and Verbal shift change report given to ALENA Perrin (oncoming nurse) by Robles Forte RN (offgoing nurse). Report included the following information SBAR, Kardex, Intake/Output, MAR, Recent Results and Cardiac Rhythm NSR/ST. 2100 Introduced self as primary RN. Initial assessment completed. VSS. Pt denies additional complaints at this time. Plan for the evening discussed with pt and she verbalized understanding. Bed locked and in low position with call bell within reach. Instructed pt to press call joya when needed. White board updated with this RN's name. Denies any sob or wheezing at this time. 2300 No acute distress. Pt states she removes the nasal cannula at times. No sob. CHG bath. Removed IV to the right hand. 0325 Purposeful hourly rounding completed. Patient has no complaints at this time. Rates pain a 0/10. Does not have to use the restroom at this time. Patient in a position of comfort. Belongings secure in their room, side rails up x 2, bedside table within reach, and call bell within reach. Will continue to monitor and re-assess as appropriate. 0655 Pt resting quietly in bed with eyes closed, respirations even and unlabored. No acute distress noted. Call bell within reach. 0715 Bedside and Verbal shift change report given to Robles Forte RN (oncoming nurse) by Adilson Motley RN (offgoing nurse). Report included the following information SBAR, Kardex, Intake/Output, MAR, Recent Results and Cardiac Rhythm NSR. 06-Dec-2024 08:34

## 2025-01-16 ENCOUNTER — HOSPITAL ENCOUNTER (EMERGENCY)
Facility: HOSPITAL | Age: 40
Discharge: HOME | End: 2025-01-16
Attending: EMERGENCY MEDICINE
Payer: COMMERCIAL

## 2025-01-16 ENCOUNTER — APPOINTMENT (OUTPATIENT)
Facility: HOSPITAL | Age: 40
End: 2025-01-16
Payer: COMMERCIAL

## 2025-01-16 VITALS
SYSTOLIC BLOOD PRESSURE: 137 MMHG | OXYGEN SATURATION: 98 % | DIASTOLIC BLOOD PRESSURE: 77 MMHG | TEMPERATURE: 98.5 F | HEIGHT: 64 IN | WEIGHT: 293 LBS | HEART RATE: 89 BPM | BODY MASS INDEX: 50.02 KG/M2 | RESPIRATION RATE: 19 BRPM

## 2025-01-16 DIAGNOSIS — R10.9 ABDOMINAL PAIN, UNSPECIFIED ABDOMINAL LOCATION: Primary | ICD-10-CM

## 2025-01-16 LAB
ALBUMIN SERPL-MCNC: 3.6 G/DL (ref 3.5–5)
ALBUMIN/GLOB SERPL: 0.8 (ref 1.1–2.2)
ALP SERPL-CCNC: 81 U/L (ref 45–117)
ALT SERPL-CCNC: 20 U/L (ref 12–78)
ANION GAP SERPL CALC-SCNC: 9 MMOL/L (ref 2–12)
APPEARANCE UR: ABNORMAL
AST SERPL-CCNC: 11 U/L (ref 15–37)
BACTERIA URNS QL MICRO: NEGATIVE /HPF
BASOPHILS # BLD: 0.02 K/UL (ref 0–0.1)
BASOPHILS NFR BLD: 0.2 % (ref 0–1)
BILIRUB SERPL-MCNC: 0.4 MG/DL (ref 0.2–1)
BILIRUB UR QL: NEGATIVE
BUN SERPL-MCNC: 12 MG/DL (ref 6–20)
BUN/CREAT SERPL: 15 (ref 12–20)
CALCIUM SERPL-MCNC: 8.6 MG/DL (ref 8.5–10.1)
CHLORIDE SERPL-SCNC: 105 MMOL/L (ref 97–108)
CO2 SERPL-SCNC: 27 MMOL/L (ref 21–32)
COLOR UR: YELLOW
CREAT SERPL-MCNC: 0.82 MG/DL (ref 0.55–1.02)
DIFFERENTIAL METHOD BLD: ABNORMAL
EOSINOPHIL # BLD: 0.38 K/UL (ref 0–0.4)
EOSINOPHIL NFR BLD: 3.4 % (ref 0–7)
EPITH CASTS URNS QL MICRO: ABNORMAL /LPF
ERYTHROCYTE [DISTWIDTH] IN BLOOD BY AUTOMATED COUNT: 18.9 % (ref 11.5–14.5)
GLOBULIN SER CALC-MCNC: 4.4 G/DL (ref 2–4)
GLUCOSE SERPL-MCNC: 89 MG/DL (ref 65–100)
GLUCOSE UR STRIP.AUTO-MCNC: NEGATIVE MG/DL
HCG UR QL: NEGATIVE
HCT VFR BLD AUTO: 32.6 % (ref 35–47)
HGB BLD-MCNC: 9.9 G/DL (ref 11.5–16)
HGB UR QL STRIP: ABNORMAL
IMM GRANULOCYTES # BLD AUTO: 0.06 K/UL (ref 0–0.04)
IMM GRANULOCYTES NFR BLD AUTO: 0.5 % (ref 0–0.5)
KETONES UR QL STRIP.AUTO: NEGATIVE MG/DL
LEUKOCYTE ESTERASE UR QL STRIP.AUTO: ABNORMAL
LIPASE SERPL-CCNC: 31 U/L (ref 13–75)
LYMPHOCYTES # BLD: 3.01 K/UL (ref 0.8–3.5)
LYMPHOCYTES NFR BLD: 26.9 % (ref 12–49)
MCH RBC QN AUTO: 20.5 PG (ref 26–34)
MCHC RBC AUTO-ENTMCNC: 30.4 G/DL (ref 30–36.5)
MCV RBC AUTO: 67.5 FL (ref 80–99)
MONOCYTES # BLD: 0.71 K/UL (ref 0–1)
MONOCYTES NFR BLD: 6.3 % (ref 5–13)
NEUTS SEG # BLD: 7.02 K/UL (ref 1.8–8)
NEUTS SEG NFR BLD: 62.7 % (ref 32–75)
NITRITE UR QL STRIP.AUTO: NEGATIVE
NRBC # BLD: 0 K/UL (ref 0–0.01)
NRBC BLD-RTO: 0 PER 100 WBC
PH UR STRIP: 6 (ref 5–8)
PLATELET # BLD AUTO: 399 K/UL (ref 150–400)
PMV BLD AUTO: 9.2 FL (ref 8.9–12.9)
POTASSIUM SERPL-SCNC: 3.9 MMOL/L (ref 3.5–5.1)
PROT SERPL-MCNC: 8 G/DL (ref 6.4–8.2)
PROT UR STRIP-MCNC: 30 MG/DL
RBC # BLD AUTO: 4.83 M/UL (ref 3.8–5.2)
RBC #/AREA URNS HPF: ABNORMAL /HPF (ref 0–5)
RBC MORPH BLD: ABNORMAL
SODIUM SERPL-SCNC: 141 MMOL/L (ref 136–145)
SP GR UR REFRACTOMETRY: 1.02 (ref 1–1.03)
URINE CULTURE IF INDICATED: ABNORMAL
UROBILINOGEN UR QL STRIP.AUTO: 0.2 EU/DL (ref 0.2–1)
WBC # BLD AUTO: 11.2 K/UL (ref 3.6–11)
WBC URNS QL MICRO: ABNORMAL /HPF (ref 0–4)

## 2025-01-16 PROCEDURE — 6360000004 HC RX CONTRAST MEDICATION: Performed by: EMERGENCY MEDICINE

## 2025-01-16 PROCEDURE — 83690 ASSAY OF LIPASE: CPT

## 2025-01-16 PROCEDURE — 85025 COMPLETE CBC W/AUTO DIFF WBC: CPT

## 2025-01-16 PROCEDURE — 81001 URINALYSIS AUTO W/SCOPE: CPT

## 2025-01-16 PROCEDURE — 81025 URINE PREGNANCY TEST: CPT

## 2025-01-16 PROCEDURE — 80053 COMPREHEN METABOLIC PANEL: CPT

## 2025-01-16 PROCEDURE — 74177 CT ABD & PELVIS W/CONTRAST: CPT

## 2025-01-16 PROCEDURE — 99285 EMERGENCY DEPT VISIT HI MDM: CPT

## 2025-01-16 PROCEDURE — 36415 COLL VENOUS BLD VENIPUNCTURE: CPT

## 2025-01-16 RX ORDER — IOPAMIDOL 755 MG/ML
100 INJECTION, SOLUTION INTRAVASCULAR
Status: COMPLETED | OUTPATIENT
Start: 2025-01-16 | End: 2025-01-16

## 2025-01-16 RX ADMIN — IOPAMIDOL 100 ML: 755 INJECTION, SOLUTION INTRAVENOUS at 20:42

## 2025-01-16 ASSESSMENT — PAIN - FUNCTIONAL ASSESSMENT
PAIN_FUNCTIONAL_ASSESSMENT: NONE - DENIES PAIN
PAIN_FUNCTIONAL_ASSESSMENT: NONE - DENIES PAIN

## 2025-01-16 ASSESSMENT — ENCOUNTER SYMPTOMS
ABDOMINAL PAIN: 1
COLOR CHANGE: 0
CONSTIPATION: 0
VOMITING: 0
NAUSEA: 0
BACK PAIN: 0
DIARRHEA: 0
SHORTNESS OF BREATH: 0

## 2025-01-16 ASSESSMENT — LIFESTYLE VARIABLES
HOW OFTEN DO YOU HAVE A DRINK CONTAINING ALCOHOL: MONTHLY OR LESS
HOW MANY STANDARD DRINKS CONTAINING ALCOHOL DO YOU HAVE ON A TYPICAL DAY: 1 OR 2

## 2025-01-17 NOTE — ED TRIAGE NOTES
Pt ambulatory to the treatment area states she has sharp pain on her lower abdomen since 3 days ago.Denies nausea or vomitting

## 2025-01-17 NOTE — ED PROVIDER NOTES
[place] EMERGENCY DEPARTMENT  EMERGENCY DEPARTMENT ENCOUNTER      Pt Name: Latesha Nieves  MRN: [id number]  Birthdate 1985  Date of evaluation: 1/16/2025  Provider: Chuck Arauz MD    CHIEF COMPLAINT       Chief Complaint   Patient presents with    Abdominal Pain     Lower abdominal pain         HISTORY OF PRESENT ILLNESS   (Location/Symptom, Timing/Onset, Context/Setting, Quality, Duration, Modifying Factors, Severity)  Note limiting factors.   Latesha Nieves is a 39 y.o. female who presents to the emergency department      The history is provided by the patient. No  was used.   Abdominal Pain  Pain location:  LLQ  Pain quality: dull    Pain radiates to:  Does not radiate  Pain severity:  Moderate  Onset quality:  Sudden  Timing:  Intermittent  Progression:  Resolved  Chronicity:  New  Associated symptoms: no chest pain, no chills, no constipation, no diarrhea, no dysuria, no fever, no hematuria, no nausea, no shortness of breath and no vomiting        Nursing Notes were reviewed.    REVIEW OF SYSTEMS    (2-9 systems for level 4, 10 or more for level 5)     Review of Systems   Constitutional:  Negative for activity change, chills and fever.   HENT:  Negative for nosebleeds.    Eyes:  Negative for visual disturbance.   Respiratory:  Negative for shortness of breath.    Cardiovascular:  Negative for chest pain and palpitations.   Gastrointestinal:  Positive for abdominal pain. Negative for constipation, diarrhea, nausea and vomiting.   Genitourinary:  Negative for difficulty urinating, dysuria, hematuria and urgency.   Musculoskeletal:  Negative for back pain, neck pain and neck stiffness.   Skin:  Negative for color change.   Allergic/Immunologic: Negative for immunocompromised state.   Neurological:  Negative for dizziness, seizures, syncope, weakness, light-headedness, numbness and headaches.   Psychiatric/Behavioral:  Negative for behavioral problems, confusion, hallucinations,

## 2025-05-13 ENCOUNTER — TELEPHONE (OUTPATIENT)
Dept: PRIMARY CARE CLINIC | Facility: CLINIC | Age: 40
End: 2025-05-13

## 2025-05-13 NOTE — TELEPHONE ENCOUNTER
----- Message from Arabella SAEED sent at 5/12/2025  3:57 PM EDT -----  Regarding: [facility] Appointment Request  E[facility] Appointment Request    Patient needs appointment for E[facility] Appointment Type: Existing Condition Follow Up.    Patient Requested Dates(s): ASAP  Patient Requested Time: early morning  Provider Name: any provider within the same office    Reason for Appointment Request: Established Patient - Available appointments did not meet patient need. Patient would like to cme in for a regular check with any provider that is available.  --------------------------------------------------------------------------------------------------------------------------    Relationship to Patient: Self     Call Back Information: OK to leave message on voicemail  Preferred Call Back Number: Phone [phone]

## 2025-05-15 ENCOUNTER — TELEPHONE (OUTPATIENT)
Dept: PRIMARY CARE CLINIC | Facility: CLINIC | Age: 40
End: 2025-05-15

## 2025-05-15 NOTE — TELEPHONE ENCOUNTER
----- Message from Priya FUENTES sent at 5/15/2025 12:10 PM EDT -----  Regarding: E[facility] Appointment Request  E[facility] Appointment Request    Patient needs appointment for E[facility] Appointment Type: Annual Visit.    Patient Requested Dates(s):as soon as possible   Patient Requested Time:9:30 Am   Provider Name:any doctor     Reason for Appointment Request: Other need a  next week appointment   --------------------------------------------------------------------------------------------------------------------------    Relationship to Patient: Self     Call Back Information: OK to leave message on voicemail  Preferred Call Back Number: Phone [phone]

## 2025-05-29 ENCOUNTER — OFFICE VISIT (OUTPATIENT)
Dept: PRIMARY CARE CLINIC | Facility: CLINIC | Age: 40
End: 2025-05-29
Payer: COMMERCIAL

## 2025-05-29 ENCOUNTER — TELEPHONE (OUTPATIENT)
Age: 40
End: 2025-05-29

## 2025-05-29 VITALS
SYSTOLIC BLOOD PRESSURE: 134 MMHG | WEIGHT: 293 LBS | BODY MASS INDEX: 50.02 KG/M2 | DIASTOLIC BLOOD PRESSURE: 79 MMHG | HEIGHT: 64 IN | TEMPERATURE: 97.1 F | OXYGEN SATURATION: 98 % | HEART RATE: 83 BPM | RESPIRATION RATE: 18 BRPM

## 2025-05-29 DIAGNOSIS — N39.3 STRESS INCONTINENCE OF URINE: ICD-10-CM

## 2025-05-29 DIAGNOSIS — E66.813 CLASS 3 SEVERE OBESITY DUE TO EXCESS CALORIES WITHOUT SERIOUS COMORBIDITY WITH BODY MASS INDEX (BMI) OF 60.0 TO 69.9 IN ADULT (HCC): ICD-10-CM

## 2025-05-29 DIAGNOSIS — K21.9 GASTROESOPHAGEAL REFLUX DISEASE WITHOUT ESOPHAGITIS: ICD-10-CM

## 2025-05-29 DIAGNOSIS — J45.909 UNCONTROLLED ASTHMA: ICD-10-CM

## 2025-05-29 DIAGNOSIS — D64.9 ANEMIA, UNSPECIFIED TYPE: ICD-10-CM

## 2025-05-29 DIAGNOSIS — Z01.84 IMMUNITY STATUS TESTING: ICD-10-CM

## 2025-05-29 DIAGNOSIS — Z12.4 CERVICAL CANCER SCREENING: ICD-10-CM

## 2025-05-29 DIAGNOSIS — R73.03 PREDIABETES: ICD-10-CM

## 2025-05-29 DIAGNOSIS — Z76.89 ENCOUNTER TO ESTABLISH CARE: Primary | ICD-10-CM

## 2025-05-29 DIAGNOSIS — Z12.31 ENCOUNTER FOR SCREENING MAMMOGRAM FOR MALIGNANT NEOPLASM OF BREAST: ICD-10-CM

## 2025-05-29 DIAGNOSIS — R06.83 SNORING: ICD-10-CM

## 2025-05-29 DIAGNOSIS — R06.81 WITNESSED EPISODE OF APNEA: ICD-10-CM

## 2025-05-29 LAB
ALBUMIN SERPL-MCNC: 3.4 G/DL (ref 3.5–5)
ALBUMIN/GLOB SERPL: 0.8 (ref 1.1–2.2)
ALP SERPL-CCNC: 75 U/L (ref 45–117)
ALT SERPL-CCNC: 22 U/L (ref 12–78)
ANION GAP SERPL CALC-SCNC: 5 MMOL/L (ref 2–12)
APPEARANCE UR: ABNORMAL
AST SERPL-CCNC: 15 U/L (ref 15–37)
BACTERIA URNS QL MICRO: NEGATIVE /HPF
BASOPHILS # BLD: 0.19 K/UL (ref 0–0.1)
BASOPHILS NFR BLD: 2 % (ref 0–1)
BILIRUB SERPL-MCNC: 0.5 MG/DL (ref 0.2–1)
BILIRUB UR QL: NEGATIVE
BUN SERPL-MCNC: 13 MG/DL (ref 6–20)
BUN/CREAT SERPL: 19 (ref 12–20)
CALCIUM SERPL-MCNC: 8.8 MG/DL (ref 8.5–10.1)
CHLORIDE SERPL-SCNC: 109 MMOL/L (ref 97–108)
CHOLEST SERPL-MCNC: 134 MG/DL
CO2 SERPL-SCNC: 24 MMOL/L (ref 21–32)
COLOR UR: ABNORMAL
CREAT SERPL-MCNC: 0.68 MG/DL (ref 0.55–1.02)
DIFFERENTIAL METHOD BLD: ABNORMAL
EOSINOPHIL # BLD: 0.28 K/UL (ref 0–0.4)
EOSINOPHIL NFR BLD: 3 % (ref 0–7)
EPITH CASTS URNS QL MICRO: ABNORMAL /LPF
ERYTHROCYTE [DISTWIDTH] IN BLOOD BY AUTOMATED COUNT: 19.1 % (ref 11.5–14.5)
EST. AVERAGE GLUCOSE BLD GHB EST-MCNC: 134 MG/DL
GLOBULIN SER CALC-MCNC: 4.1 G/DL (ref 2–4)
GLUCOSE SERPL-MCNC: 100 MG/DL (ref 65–100)
GLUCOSE UR STRIP.AUTO-MCNC: NEGATIVE MG/DL
HBA1C MFR BLD: 6.3 % (ref 4–5.6)
HBV SURFACE AB SER QL: NONREACTIVE
HBV SURFACE AB SER-ACNC: <3.1 MIU/ML
HCT VFR BLD AUTO: 32.7 % (ref 35–47)
HDLC SERPL-MCNC: 42 MG/DL
HDLC SERPL: 3.2 (ref 0–5)
HGB BLD-MCNC: 9.5 G/DL (ref 11.5–16)
HGB UR QL STRIP: NEGATIVE
IMM GRANULOCYTES # BLD AUTO: 0 K/UL
IMM GRANULOCYTES NFR BLD AUTO: 0 %
IRON SATN MFR SERPL: 4 % (ref 20–50)
IRON SERPL-MCNC: 16 UG/DL (ref 35–150)
KETONES UR QL STRIP.AUTO: NEGATIVE MG/DL
LDLC SERPL CALC-MCNC: 82.6 MG/DL (ref 0–100)
LEUKOCYTE ESTERASE UR QL STRIP.AUTO: NEGATIVE
LYMPHOCYTES # BLD: 2.05 K/UL (ref 0.8–3.5)
LYMPHOCYTES NFR BLD: 22 % (ref 12–49)
MCH RBC QN AUTO: 19.9 PG (ref 26–34)
MCHC RBC AUTO-ENTMCNC: 29.1 G/DL (ref 30–36.5)
MCV RBC AUTO: 68.4 FL (ref 80–99)
MONOCYTES # BLD: 0.56 K/UL (ref 0–1)
MONOCYTES NFR BLD: 6 % (ref 5–13)
NEUTS SEG # BLD: 6.22 K/UL (ref 1.8–8)
NEUTS SEG NFR BLD: 67 % (ref 32–75)
NITRITE UR QL STRIP.AUTO: NEGATIVE
NRBC # BLD: 0 K/UL (ref 0–0.01)
NRBC BLD-RTO: 0 PER 100 WBC
PH UR STRIP: 6 (ref 5–8)
PLATELET # BLD AUTO: 400 K/UL (ref 150–400)
PMV BLD AUTO: 9.5 FL (ref 8.9–12.9)
POTASSIUM SERPL-SCNC: 4.5 MMOL/L (ref 3.5–5.1)
PROT SERPL-MCNC: 7.5 G/DL (ref 6.4–8.2)
PROT UR STRIP-MCNC: NEGATIVE MG/DL
RBC # BLD AUTO: 4.78 M/UL (ref 3.8–5.2)
RBC #/AREA URNS HPF: ABNORMAL /HPF (ref 0–5)
RBC MORPH BLD: ABNORMAL
SODIUM SERPL-SCNC: 138 MMOL/L (ref 136–145)
SP GR UR REFRACTOMETRY: 1.03 (ref 1–1.03)
TIBC SERPL-MCNC: 381 UG/DL (ref 250–450)
TRIGL SERPL-MCNC: 47 MG/DL
URINE CULTURE IF INDICATED: ABNORMAL
UROBILINOGEN UR QL STRIP.AUTO: 1 EU/DL (ref 0.2–1)
VLDLC SERPL CALC-MCNC: 9.4 MG/DL
WBC # BLD AUTO: 9.3 K/UL (ref 3.6–11)
WBC URNS QL MICRO: ABNORMAL /HPF (ref 0–4)

## 2025-05-29 PROCEDURE — 99204 OFFICE O/P NEW MOD 45 MIN: CPT

## 2025-05-29 PROCEDURE — 3078F DIAST BP <80 MM HG: CPT

## 2025-05-29 PROCEDURE — 3075F SYST BP GE 130 - 139MM HG: CPT

## 2025-05-29 RX ORDER — FLUTICASONE PROPIONATE AND SALMETEROL 250; 50 UG/1; UG/1
1 POWDER RESPIRATORY (INHALATION) EVERY 12 HOURS
Qty: 60 EACH | Refills: 1 | Status: SHIPPED | OUTPATIENT
Start: 2025-05-29

## 2025-05-29 RX ORDER — FAMOTIDINE 20 MG/1
20 TABLET, FILM COATED ORAL 2 TIMES DAILY
Qty: 60 TABLET | Refills: 3 | Status: SHIPPED | OUTPATIENT
Start: 2025-05-29

## 2025-05-29 RX ORDER — ALBUTEROL SULFATE 90 UG/1
2 INHALANT RESPIRATORY (INHALATION) 4 TIMES DAILY PRN
Qty: 54 G | Refills: 1 | Status: SHIPPED | OUTPATIENT
Start: 2025-05-29

## 2025-05-29 SDOH — ECONOMIC STABILITY: FOOD INSECURITY: WITHIN THE PAST 12 MONTHS, YOU WORRIED THAT YOUR FOOD WOULD RUN OUT BEFORE YOU GOT MONEY TO BUY MORE.: NEVER TRUE

## 2025-05-29 SDOH — ECONOMIC STABILITY: FOOD INSECURITY: WITHIN THE PAST 12 MONTHS, THE FOOD YOU BOUGHT JUST DIDN'T LAST AND YOU DIDN'T HAVE MONEY TO GET MORE.: NEVER TRUE

## 2025-05-29 ASSESSMENT — PATIENT HEALTH QUESTIONNAIRE - PHQ9
SUM OF ALL RESPONSES TO PHQ QUESTIONS 1-9: 0
1. LITTLE INTEREST OR PLEASURE IN DOING THINGS: NOT AT ALL
SUM OF ALL RESPONSES TO PHQ QUESTIONS 1-9: 0
2. FEELING DOWN, DEPRESSED OR HOPELESS: NOT AT ALL
SUM OF ALL RESPONSES TO PHQ QUESTIONS 1-9: 0
SUM OF ALL RESPONSES TO PHQ QUESTIONS 1-9: 0

## 2025-05-29 ASSESSMENT — ENCOUNTER SYMPTOMS
COUGH: 1
SINUS PAIN: 0
WHEEZING: 1
ABDOMINAL PAIN: 0
NAUSEA: 0
DIARRHEA: 0
VOMITING: 0
RHINORRHEA: 0
SORE THROAT: 0
SHORTNESS OF BREATH: 1
BLOOD IN STOOL: 0
CONSTIPATION: 0
APNEA: 1

## 2025-05-29 NOTE — PROGRESS NOTES
[facility]   [address], [address]  [place] [address]  P: [phone]  F: [phone]    SUBJECTIVE     HPI:     Latesha Nieves is a 39 y.o. female who is seen in the clinic for   Chief Complaint   Patient presents with    New Patient     Patient coughs and wheezing alot. Currently using someone else's inhaler.    Annual Exam        The patient presents to the office today to establish care. Last seen in my office by Dr. Mariscal in 2021.    Uncontrolled asthma  She reports feeling winded and having palpitations when walking, including when walking into her apartment. She reports difficulty breathing when laying on her back. She snores at night and has been told of apneic events. Has been using a friend's Symbicort inhaler 160/4.5 mg as needed. Does often have wheezing.    GERD  Reports a chronic cough which seems to be associated with acid reflux. Also burping. Notes cough does cause some urinary incontinence. Has tried baking soda, Tums, and OTC Prilosec with some improvement.    No history of pregnancy. Also reports increased urinary frequency. Sometimes has dysuria if she holds her urine, but no blood in her urine.    Prediabetes, Class 3 severe obesity due to excess calories without serious comorbidity with body mass index (BMI) of 60.0 to 69.9 in adult (HCC)  Reports her weight tends to be stable. States she does not eat a lot. She does not exercise.    Has a history of fibroids and was told her iron was low.      Health screenings:   Eye exam Done 2025  Dental exam Overdue    PAP smear Due    Mammogram Due August 2025   Tdap  unknown  COVID vaccine Never done      Pneumonia vaccine  Declined        Patient Active Problem List   Diagnosis    Morbid obesity (HCC)    Community acquired pneumonia    Hypertension        Past Medical History:   Diagnosis Date    Asthma     HPV (human papilloma virus) infection     Ill-defined condition     \"Bronchitis\"    Trichomonas      Current

## 2025-05-29 NOTE — TELEPHONE ENCOUNTER
Patient has been sent the link for our pre-recorded [facility] Medical Weight Loss Orientation. Patient is required to register, view the recording, call insurance to verify benefits, then send an email to the  to schedule a consultation.

## 2025-05-29 NOTE — PROGRESS NOTES
Health Decision Maker has been checked with the patient          Chief Complaint   Patient presents with    New Patient     Patient coughs and wheezing alot. Currently using someone else's inhaler.    Annual Exam       \"Have you been to the ER, urgent care clinic since your last visit?  Hospitalized since your last visit?\"      Yes, [facility] urgent care    “Have you seen or consulted any other health care providers outside of [facility] since your last visit?”      NO      Vitals:    05/29/25 0832   Temp: 97.1 °F (36.2 °C)   TempSrc: Temporal   Weight: (!) 181.9 kg (401 lb 1.6 oz)   Height: 1.626 m (5' 4\")          “Have you had a pap smear?”      YES, patient doesn't remember where she went.            Click Here for Release of Records Request

## 2025-05-30 LAB — TSH SERPL DL<=0.05 MIU/L-ACNC: 1.23 UIU/ML (ref 0.45–4.5)

## 2025-06-02 ENCOUNTER — RESULTS FOLLOW-UP (OUTPATIENT)
Dept: PRIMARY CARE CLINIC | Facility: CLINIC | Age: 40
End: 2025-06-02